# Patient Record
Sex: MALE | Race: WHITE | NOT HISPANIC OR LATINO | URBAN - METROPOLITAN AREA
[De-identification: names, ages, dates, MRNs, and addresses within clinical notes are randomized per-mention and may not be internally consistent; named-entity substitution may affect disease eponyms.]

---

## 2023-02-20 ENCOUNTER — OFFICE (OUTPATIENT)
Dept: URBAN - METROPOLITAN AREA CLINIC 27 | Facility: CLINIC | Age: 68
End: 2023-02-20
Payer: COMMERCIAL

## 2023-02-20 VITALS
SYSTOLIC BLOOD PRESSURE: 145 MMHG | HEART RATE: 94 BPM | WEIGHT: 215 LBS | DIASTOLIC BLOOD PRESSURE: 82 MMHG | TEMPERATURE: 97.5 F | HEIGHT: 70 IN

## 2023-02-20 DIAGNOSIS — E83.110 HEREDITARY HEMOCHROMATOSIS: ICD-10-CM

## 2023-02-20 DIAGNOSIS — Z80.0 FAMILY HISTORY OF MALIGNANT NEOPLASM OF DIGESTIVE ORGANS: ICD-10-CM

## 2023-02-20 DIAGNOSIS — Z86.010 PERSONAL HISTORY OF COLONIC POLYPS: ICD-10-CM

## 2023-02-20 DIAGNOSIS — K57.90 DIVERTICULOSIS OF INTESTINE, PART UNSPECIFIED, WITHOUT PERFO: ICD-10-CM

## 2023-02-20 DIAGNOSIS — K21.9 GASTRO-ESOPHAGEAL REFLUX DISEASE WITHOUT ESOPHAGITIS: ICD-10-CM

## 2023-02-20 PROCEDURE — 99214 OFFICE O/P EST MOD 30 MIN: CPT | Performed by: INTERNAL MEDICINE

## 2023-05-15 ENCOUNTER — HOSPITAL ENCOUNTER (OUTPATIENT)
Dept: DATA CONVERSION | Facility: HOSPITAL | Age: 68
End: 2023-05-15
Attending: OTOLARYNGOLOGY | Admitting: OTOLARYNGOLOGY
Payer: COMMERCIAL

## 2023-05-15 DIAGNOSIS — J34.2 DEVIATED NASAL SEPTUM: ICD-10-CM

## 2023-05-15 DIAGNOSIS — J34.89 OTHER SPECIFIED DISORDERS OF NOSE AND NASAL SINUSES: ICD-10-CM

## 2023-05-15 DIAGNOSIS — E78.5 HYPERLIPIDEMIA, UNSPECIFIED: ICD-10-CM

## 2023-05-15 DIAGNOSIS — K21.9 GASTRO-ESOPHAGEAL REFLUX DISEASE WITHOUT ESOPHAGITIS: ICD-10-CM

## 2023-05-15 DIAGNOSIS — J34.3 HYPERTROPHY OF NASAL TURBINATES: ICD-10-CM

## 2023-05-15 DIAGNOSIS — G47.33 OBSTRUCTIVE SLEEP APNEA (ADULT) (PEDIATRIC): ICD-10-CM

## 2023-05-15 DIAGNOSIS — J32.4 CHRONIC PANSINUSITIS: ICD-10-CM

## 2023-05-15 DIAGNOSIS — G43.909 MIGRAINE, UNSPECIFIED, NOT INTRACTABLE, WITHOUT STATUS MIGRAINOSUS: ICD-10-CM

## 2023-06-12 PROBLEM — E53.8 VITAMIN B12 DEFICIENCY: Status: ACTIVE | Noted: 2023-06-12

## 2023-06-12 PROBLEM — E78.00 HYPERCHOLESTEROLEMIA: Status: ACTIVE | Noted: 2023-06-12

## 2023-06-12 PROBLEM — Z00.00 MEDICARE ANNUAL WELLNESS VISIT, SUBSEQUENT: Status: ACTIVE | Noted: 2023-06-12

## 2023-06-12 PROBLEM — Z11.59 ENCOUNTER FOR HEPATITIS C SCREENING TEST FOR LOW RISK PATIENT: Chronic | Status: ACTIVE | Noted: 2023-06-12

## 2023-06-12 PROBLEM — J34.3 HYPERTROPHY OF BOTH INFERIOR NASAL TURBINATES: Status: ACTIVE | Noted: 2023-06-12

## 2023-06-12 PROBLEM — E83.119 HEMOCHROMATOSIS, UNSPECIFIED: Status: ACTIVE | Noted: 2023-06-12

## 2023-06-12 PROBLEM — D69.6 THROMBOCYTOPENIA (CMS-HCC): Status: ACTIVE | Noted: 2023-06-12

## 2023-06-12 PROBLEM — R73.03 PREDIABETES: Status: ACTIVE | Noted: 2023-06-12

## 2023-06-12 PROBLEM — E55.9 VITAMIN D DEFICIENCY: Status: ACTIVE | Noted: 2023-06-12

## 2023-06-12 PROBLEM — J32.4 CHRONIC PANSINUSITIS: Status: ACTIVE | Noted: 2023-06-12

## 2023-06-12 PROBLEM — G43.909 MIGRAINE: Status: ACTIVE | Noted: 2023-06-12

## 2023-06-12 PROBLEM — R97.20 ELEVATED PSA: Status: ACTIVE | Noted: 2023-06-12

## 2023-06-12 PROBLEM — E83.19 IRON OVERLOAD: Status: ACTIVE | Noted: 2023-06-12

## 2023-06-12 PROBLEM — Z11.59 ENCOUNTER FOR HEPATITIS C SCREENING TEST FOR LOW RISK PATIENT: Status: ACTIVE | Noted: 2023-06-12

## 2023-06-12 PROBLEM — J34.89 CONCHA BULLOSA: Status: ACTIVE | Noted: 2023-06-12

## 2023-06-12 PROBLEM — G47.33 OSA (OBSTRUCTIVE SLEEP APNEA): Status: ACTIVE | Noted: 2023-06-12

## 2023-06-12 PROBLEM — J30.9 ALLERGIC RHINITIS: Status: ACTIVE | Noted: 2023-06-12

## 2023-06-12 PROBLEM — F33.42 RECURRENT MAJOR DEPRESSIVE DISORDER, IN FULL REMISSION (CMS-HCC): Status: ACTIVE | Noted: 2023-06-12

## 2023-06-12 PROBLEM — Z00.00 MEDICARE ANNUAL WELLNESS VISIT, SUBSEQUENT: Chronic | Status: ACTIVE | Noted: 2023-06-12

## 2023-06-12 PROBLEM — J34.2 DEVIATED NASAL SEPTUM: Status: ACTIVE | Noted: 2023-06-12

## 2023-06-12 PROBLEM — H90.72 MIXED CONDUCTIVE AND SENSORINEURAL HEARING LOSS OF LEFT EAR WITH UNRESTRICTED HEARING OF RIGHT EAR: Status: ACTIVE | Noted: 2023-06-12

## 2023-06-12 RX ORDER — PSYLLIUM HUSK 0.4 G
CAPSULE ORAL
COMMUNITY
Start: 2022-12-16

## 2023-06-12 RX ORDER — SALINE NASAL SPRAY 1.5 OZ
SOLUTION NASAL
COMMUNITY
Start: 2023-05-15 | End: 2023-12-18 | Stop reason: WASHOUT

## 2023-06-12 RX ORDER — ACETAMINOPHEN 500 MG
TABLET ORAL
COMMUNITY
Start: 2022-12-16

## 2023-06-12 RX ORDER — LANOLIN ALCOHOL/MO/W.PET/CERES
CREAM (GRAM) TOPICAL
COMMUNITY
Start: 2022-12-16

## 2023-06-12 RX ORDER — RIZATRIPTAN BENZOATE 10 MG/1
TABLET ORAL
COMMUNITY
Start: 2022-07-01

## 2023-06-12 RX ORDER — AZELASTINE HCL 205.5 UG/1
SPRAY NASAL 2 TIMES DAILY
COMMUNITY
Start: 2022-09-29 | End: 2023-12-15 | Stop reason: WASHOUT

## 2023-06-12 RX ORDER — PROPRANOLOL HYDROCHLORIDE 60 MG/1
1 TABLET ORAL EVERY 12 HOURS
COMMUNITY
Start: 2012-11-19

## 2023-06-12 RX ORDER — TAMSULOSIN HYDROCHLORIDE 0.4 MG/1
2 CAPSULE ORAL DAILY
COMMUNITY

## 2023-06-12 RX ORDER — VIT C/E/ZN/COPPR/LUTEIN/ZEAXAN 250MG-90MG
CAPSULE ORAL
COMMUNITY

## 2023-06-12 RX ORDER — CYANOCOBALAMIN 1000 UG/ML
INJECTION, SOLUTION INTRAMUSCULAR; SUBCUTANEOUS
COMMUNITY
End: 2023-06-16 | Stop reason: WASHOUT

## 2023-06-12 RX ORDER — OMEPRAZOLE 20 MG/1
CAPSULE, DELAYED RELEASE ORAL
COMMUNITY

## 2023-06-13 LAB
COMPLETE PATHOLOGY REPORT: NORMAL
CONVERTED CLINICAL DIAGNOSIS-HISTORY: NORMAL
CONVERTED FINAL DIAGNOSIS: NORMAL
CONVERTED FINAL REPORT PDF LINK TO COPY AND PASTE: NORMAL
CONVERTED GROSS DESCRIPTION: NORMAL

## 2023-06-14 NOTE — PROGRESS NOTES
Mitchell Stock is a 68 y.o. male here for follow up   Chief Complaint   Patient presents with    Medicare Annual Wellness Visit Subsequent    Follow-up       Wellness/health maintenance also discussed    Pt is doing well     Chief Complaint   Patient presents with    Medicare Annual Wellness Visit Subsequent    Follow-up        HPI     pt is here for f/u depression, vit d vitb12, migraines, prediabetes , chol , elevated BMI, vit D      pt states that he would like to discuss his recent sinus surgery with you    Pt would also like to discuss some minor tremors that he is having in both hands- intermittently, this has been going on for several years - pt does see Dr Lee- has discussed with them- dx benign essential tremors - pt is on propranalol for migraines     pt is doing well       May 15 had sinus surgery - Dr Isaac - doing well but per pt - he did have rough time waking up after surgery - no event occurred however        Other medical specialists are involved in patient's care.     Dr Larry Guardado- manages- hemachromatosis- ferritin normal Feb , low platelets- normal in Feb      Dr Lefty Cuevas        Any recent notes that were available were reviewed.     All conditions are monitored, evaluated and assessed regularly.     Patient is compliant with current treatment regimen/medications.      discussed P-20       Medicare Wellness Exam    The patent is being seen for subsequent  annual wellness visit  Past Medical, Surgical and family History: Reviewed and updated in chart  Medications and Supplements: Review of all medications by a prescribing practitioner or clinical pharmacist (such as prescriptions, OTC, Herbal therapies and supplements) documented in the medical record.      Immunization History   Administered Date(s) Administered    Hep A, Adult 04/24/2000    Hep B, adult 04/24/2000, 05/30/2000    IPV 05/17/2005    Influenza, High Dose Seasonal, Preservative Free 10/10/2021,  10/18/2022    Influenza, High-dose Seasonal, Quadrivalent, Preservative Free 10/15/2020    Influenza, Unspecified 10/13/2014, 09/01/2019    Influenza, seasonal, injectable 10/26/2009, 10/12/2013, 10/15/2015, 10/04/2016, 10/15/2020    Pfizer Purple Cap SARS-CoV-2 03/08/2021, 03/29/2021, 10/10/2021, 10/18/2022    Pneumococcal Conjugate PCV 13 04/04/2019    Pneumococcal Polysaccharide PPSV23 06/03/2020    Tdap 01/01/2005, 06/24/2008, 02/19/2016    Typhoid, Unspecified 08/03/2011    Zoster, live 01/01/2013         Health Maintenance Topics with due status: Overdue       Topic Date Due    Yearly Adult Physical today                     OBJECTIVE:      Vitals:    06/16/23 0908   BP: 125/76   BP Location: Left arm   Patient Position: Sitting   BP Cuff Size: Large adult   Pulse: 62   Resp: 14   Temp: 36.8 °C (98.2 °F)   TempSrc: Temporal   SpO2: 96%   Weight: 96.5 kg (212 lb 11.2 oz)         Patient is alert and oriented x 3 , NAD  HEAD- normocephalic and atraumatic   EYES-conjunctiva-normal, lids - normal  EARS/NOSE- normal external exam   NECK-supple,FROM  CV- RRR without murmur  PULM- CTA bilaterally, normal respiratory effort  RESPIRATORY EFFORT- normal , no retractions or nasal flaring   ABD- normoactive BS's   EXT- no edema,NT  SKIN- no abnormal skin lesions noted  NEURO- no focal deficits  PSYCH- pleasant, normal judgement and insight    The number and complexity of problems addressed is considered moderate.  The amount and/or complexity of data reviewed and analyzed is considered moderate. The risk of complications and/or morbidity/mortality of patient is considered moderate. Overall, this patient encounter is considered a moderate risk visit.    Patient has no concerns with pain today.      Patient is not on any high risk medications.      Patient is compliant with their medications.      Common Side Effects- Beta-blockers:    Dizziness  Feeling tired  Feeling lightheaded  Vision problems  Swelling of the hands,  feet, ankles, or legs  Decreased sexual ability    Call your doctor if you have any of these signs:  Chest pain  Irregular heartbeat  Painful erection in men    Patient's BMI is elevated.  Plan- diet and exercise- BMI is elevated. Need to increase activity on a daily basis especially walking.  Monitor  total calories per day- decrease carbohydrates and fats. Goal - lose 1-2 pounds per week.    Recommend 150 minutes of moderate-intensity exercise as tolerated per week and 2-3 days of resistance, flexibility, and neuromotor exercises per week.    Normal BMI- 18.5-25    Overweight=  BMI 26-29    Obese= BMI 30-39    Morbidly Obese = BMI >40      ASSESSMENT/PLAN:          Problem List Items Addressed This Visit          Active Problems    Allergic rhinitis    Relevant Orders    Follow Up In [facility] - PCP    Hemochromatosis, unspecified    Relevant Orders    Follow Up In [facility] - PCP    Hypercholesterolemia    Relevant Orders    Hepatic Function Panel    Lipid Panel    Medicare annual wellness visit, subsequent - Primary (Chronic)    Migraine    ELISEO (obstructive sleep apnea)    Prediabetes    Relevant Orders    Hemoglobin A1C    Recurrent major depressive disorder, in full remission (CMS/[facility])    Thrombocytopenia (CMS/[facility])    Vitamin B12 deficiency    Relevant Orders    Vitamin B12    Vitamin D deficiency    Relevant Orders    Vitamin D, Total     Other Visit Diagnoses       Screening for multiple conditions        Class 1 obesity due to excess calories with body mass index (BMI) of 30.0 to 30.9 in adult, unspecified whether serious comorbidity present                    Orders Placed This Encounter   Procedures    Vitamin D, Total    Vitamin B12    Hepatic Function Panel    Lipid Panel    Hemoglobin A1C         Continue medications      Ordered lab      Follow up in 6 months

## 2023-06-16 ENCOUNTER — OFFICE VISIT (OUTPATIENT)
Dept: PRIMARY CARE | Facility: CLINIC | Age: 68
End: 2023-06-16
Payer: MEDICARE

## 2023-06-16 ENCOUNTER — LAB (OUTPATIENT)
Dept: LAB | Facility: LAB | Age: 68
End: 2023-06-16
Payer: MEDICARE

## 2023-06-16 VITALS
BODY MASS INDEX: 30.52 KG/M2 | WEIGHT: 212.7 LBS | HEART RATE: 62 BPM | RESPIRATION RATE: 14 BRPM | DIASTOLIC BLOOD PRESSURE: 76 MMHG | SYSTOLIC BLOOD PRESSURE: 125 MMHG | TEMPERATURE: 98.2 F | OXYGEN SATURATION: 96 %

## 2023-06-16 DIAGNOSIS — G47.33 OSA (OBSTRUCTIVE SLEEP APNEA): ICD-10-CM

## 2023-06-16 DIAGNOSIS — E53.8 VITAMIN B12 DEFICIENCY: ICD-10-CM

## 2023-06-16 DIAGNOSIS — E55.9 VITAMIN D DEFICIENCY: ICD-10-CM

## 2023-06-16 DIAGNOSIS — E83.119 HEMOCHROMATOSIS, UNSPECIFIED HEMOCHROMATOSIS TYPE: ICD-10-CM

## 2023-06-16 DIAGNOSIS — J30.9 ALLERGIC RHINITIS, UNSPECIFIED SEASONALITY, UNSPECIFIED TRIGGER: ICD-10-CM

## 2023-06-16 DIAGNOSIS — Z00.00 MEDICARE ANNUAL WELLNESS VISIT, SUBSEQUENT: Primary | ICD-10-CM

## 2023-06-16 DIAGNOSIS — D69.6 THROMBOCYTOPENIA (CMS-HCC): ICD-10-CM

## 2023-06-16 DIAGNOSIS — E78.00 HYPERCHOLESTEROLEMIA: ICD-10-CM

## 2023-06-16 DIAGNOSIS — E66.09 CLASS 1 OBESITY DUE TO EXCESS CALORIES WITH BODY MASS INDEX (BMI) OF 30.0 TO 30.9 IN ADULT, UNSPECIFIED WHETHER SERIOUS COMORBIDITY PRESENT: ICD-10-CM

## 2023-06-16 DIAGNOSIS — R73.03 PREDIABETES: ICD-10-CM

## 2023-06-16 DIAGNOSIS — G43.809 OTHER MIGRAINE WITHOUT STATUS MIGRAINOSUS, NOT INTRACTABLE: ICD-10-CM

## 2023-06-16 DIAGNOSIS — Z13.89 SCREENING FOR MULTIPLE CONDITIONS: ICD-10-CM

## 2023-06-16 DIAGNOSIS — F33.42 RECURRENT MAJOR DEPRESSIVE DISORDER, IN FULL REMISSION (CMS-HCC): ICD-10-CM

## 2023-06-16 PROBLEM — R25.1 TREMORS OF NERVOUS SYSTEM: Status: ACTIVE | Noted: 2023-06-16

## 2023-06-16 PROCEDURE — G0442 ANNUAL ALCOHOL SCREEN 15 MIN: HCPCS | Performed by: FAMILY MEDICINE

## 2023-06-16 PROCEDURE — 36415 COLL VENOUS BLD VENIPUNCTURE: CPT

## 2023-06-16 PROCEDURE — 80076 HEPATIC FUNCTION PANEL: CPT

## 2023-06-16 PROCEDURE — 1157F ADVNC CARE PLAN IN RCRD: CPT | Performed by: FAMILY MEDICINE

## 2023-06-16 PROCEDURE — 83036 HEMOGLOBIN GLYCOSYLATED A1C: CPT

## 2023-06-16 PROCEDURE — 99214 OFFICE O/P EST MOD 30 MIN: CPT | Performed by: FAMILY MEDICINE

## 2023-06-16 PROCEDURE — 1036F TOBACCO NON-USER: CPT | Performed by: FAMILY MEDICINE

## 2023-06-16 PROCEDURE — 80061 LIPID PANEL: CPT

## 2023-06-16 PROCEDURE — 3008F BODY MASS INDEX DOCD: CPT | Performed by: FAMILY MEDICINE

## 2023-06-16 PROCEDURE — 1160F RVW MEDS BY RX/DR IN RCRD: CPT | Performed by: FAMILY MEDICINE

## 2023-06-16 PROCEDURE — 82607 VITAMIN B-12: CPT

## 2023-06-16 PROCEDURE — 82306 VITAMIN D 25 HYDROXY: CPT

## 2023-06-16 PROCEDURE — 1170F FXNL STATUS ASSESSED: CPT | Performed by: FAMILY MEDICINE

## 2023-06-16 PROCEDURE — G0439 PPPS, SUBSEQ VISIT: HCPCS | Performed by: FAMILY MEDICINE

## 2023-06-16 PROCEDURE — 1159F MED LIST DOCD IN RCRD: CPT | Performed by: FAMILY MEDICINE

## 2023-06-16 RX ORDER — FLUTICASONE PROPIONATE 93 UG/1
SPRAY, METERED NASAL
COMMUNITY
Start: 2023-06-14 | End: 2023-12-22 | Stop reason: SDUPTHER

## 2023-06-16 ASSESSMENT — ACTIVITIES OF DAILY LIVING (ADL)
DRESSING: INDEPENDENT
GROCERY_SHOPPING: INDEPENDENT
MANAGING_FINANCES: INDEPENDENT
TAKING_MEDICATION: INDEPENDENT
BATHING: INDEPENDENT
DOING_HOUSEWORK: INDEPENDENT

## 2023-06-16 ASSESSMENT — LIFESTYLE VARIABLES: HOW OFTEN DO YOU HAVE A DRINK CONTAINING ALCOHOL: NEVER

## 2023-06-16 ASSESSMENT — PATIENT HEALTH QUESTIONNAIRE - PHQ9
SUM OF ALL RESPONSES TO PHQ9 QUESTIONS 1 AND 2: 0
1. LITTLE INTEREST OR PLEASURE IN DOING THINGS: NOT AT ALL
2. FEELING DOWN, DEPRESSED OR HOPELESS: NOT AT ALL

## 2023-06-17 LAB
ALANINE AMINOTRANSFERASE (SGPT) (U/L) IN SER/PLAS: 20 U/L (ref 10–52)
ALBUMIN (G/DL) IN SER/PLAS: 4.5 G/DL (ref 3.4–5)
ALKALINE PHOSPHATASE (U/L) IN SER/PLAS: 84 U/L (ref 33–136)
ASPARTATE AMINOTRANSFERASE (SGOT) (U/L) IN SER/PLAS: 17 U/L (ref 9–39)
BILIRUBIN DIRECT (MG/DL) IN SER/PLAS: 0.1 MG/DL (ref 0–0.3)
BILIRUBIN TOTAL (MG/DL) IN SER/PLAS: 0.6 MG/DL (ref 0–1.2)
CALCIDIOL (25 OH VITAMIN D3) (NG/ML) IN SER/PLAS: 67 NG/ML
CHOLESTEROL (MG/DL) IN SER/PLAS: 217 MG/DL (ref 0–199)
CHOLESTEROL IN HDL (MG/DL) IN SER/PLAS: 50.4 MG/DL
CHOLESTEROL/HDL RATIO: 4.3
COBALAMIN (VITAMIN B12) (PG/ML) IN SER/PLAS: 514 PG/ML (ref 211–911)
LDL: 145 MG/DL (ref 0–99)
PROTEIN TOTAL: 7.1 G/DL (ref 6.4–8.2)
TRIGLYCERIDE (MG/DL) IN SER/PLAS: 109 MG/DL (ref 0–149)
VLDL: 22 MG/DL (ref 0–40)

## 2023-06-19 LAB
ESTIMATED AVERAGE GLUCOSE FOR HBA1C: 103 MG/DL
HEMOGLOBIN A1C/HEMOGLOBIN TOTAL IN BLOOD: 5.2 %

## 2023-08-10 LAB
ALANINE AMINOTRANSFERASE (SGPT) (U/L) IN SER/PLAS: 23 U/L (ref 10–52)
ALBUMIN (G/DL) IN SER/PLAS: 4.5 G/DL (ref 3.4–5)
ALKALINE PHOSPHATASE (U/L) IN SER/PLAS: 80 U/L (ref 33–136)
ANION GAP IN SER/PLAS: 14 MMOL/L (ref 10–20)
ASPARTATE AMINOTRANSFERASE (SGOT) (U/L) IN SER/PLAS: 19 U/L (ref 9–39)
BASOPHILS (10*3/UL) IN BLOOD BY AUTOMATED COUNT: 0.05 X10E9/L (ref 0–0.1)
BASOPHILS/100 LEUKOCYTES IN BLOOD BY AUTOMATED COUNT: 0.8 % (ref 0–2)
BILIRUBIN TOTAL (MG/DL) IN SER/PLAS: 0.6 MG/DL (ref 0–1.2)
CALCIUM (MG/DL) IN SER/PLAS: 9.4 MG/DL (ref 8.6–10.6)
CARBON DIOXIDE, TOTAL (MMOL/L) IN SER/PLAS: 24 MMOL/L (ref 21–32)
CHLORIDE (MMOL/L) IN SER/PLAS: 107 MMOL/L (ref 98–107)
CREATININE (MG/DL) IN SER/PLAS: 0.9 MG/DL (ref 0.5–1.3)
EOSINOPHILS (10*3/UL) IN BLOOD BY AUTOMATED COUNT: 0.17 X10E9/L (ref 0–0.7)
EOSINOPHILS/100 LEUKOCYTES IN BLOOD BY AUTOMATED COUNT: 2.8 % (ref 0–6)
ERYTHROCYTE DISTRIBUTION WIDTH (RATIO) BY AUTOMATED COUNT: 11.9 % (ref 11.5–14.5)
ERYTHROCYTE MEAN CORPUSCULAR HEMOGLOBIN CONCENTRATION (G/DL) BY AUTOMATED: 33.9 G/DL (ref 32–36)
ERYTHROCYTE MEAN CORPUSCULAR VOLUME (FL) BY AUTOMATED COUNT: 95 FL (ref 80–100)
ERYTHROCYTES (10*6/UL) IN BLOOD BY AUTOMATED COUNT: 4.76 X10E12/L (ref 4.5–5.9)
FERRITIN (UG/LL) IN SER/PLAS: 35 UG/L (ref 20–300)
GFR MALE: >90 ML/MIN/1.73M2
GLUCOSE (MG/DL) IN SER/PLAS: 87 MG/DL (ref 74–99)
HEMATOCRIT (%) IN BLOOD BY AUTOMATED COUNT: 45.1 % (ref 41–52)
HEMOGLOBIN (G/DL) IN BLOOD: 15.3 G/DL (ref 13.5–17.5)
IMMATURE GRANULOCYTES/100 LEUKOCYTES IN BLOOD BY AUTOMATED COUNT: 0.3 % (ref 0–0.9)
IRON (UG/DL) IN SER/PLAS: 105 UG/DL (ref 35–150)
IRON BINDING CAPACITY (UG/DL) IN SER/PLAS: 321 UG/DL (ref 240–445)
IRON SATURATION (%) IN SER/PLAS: 33 % (ref 25–45)
LEUKOCYTES (10*3/UL) IN BLOOD BY AUTOMATED COUNT: 6 X10E9/L (ref 4.4–11.3)
LYMPHOCYTES (10*3/UL) IN BLOOD BY AUTOMATED COUNT: 2.46 X10E9/L (ref 1.2–4.8)
LYMPHOCYTES/100 LEUKOCYTES IN BLOOD BY AUTOMATED COUNT: 41.1 % (ref 13–44)
MONOCYTES (10*3/UL) IN BLOOD BY AUTOMATED COUNT: 0.56 X10E9/L (ref 0.1–1)
MONOCYTES/100 LEUKOCYTES IN BLOOD BY AUTOMATED COUNT: 9.4 % (ref 2–10)
NEUTROPHILS (10*3/UL) IN BLOOD BY AUTOMATED COUNT: 2.72 X10E9/L (ref 1.2–7.7)
NEUTROPHILS/100 LEUKOCYTES IN BLOOD BY AUTOMATED COUNT: 45.6 % (ref 40–80)
NRBC (PER 100 WBCS) BY AUTOMATED COUNT: 0 /100 WBC (ref 0–0)
PLATELETS (10*3/UL) IN BLOOD AUTOMATED COUNT: 216 X10E9/L (ref 150–450)
POTASSIUM (MMOL/L) IN SER/PLAS: 4 MMOL/L (ref 3.5–5.3)
PROSTATE SPECIFIC AG (NG/ML) IN SER/PLAS: 3.9 NG/ML (ref 0–4)
PROTEIN TOTAL: 7.2 G/DL (ref 6.4–8.2)
SODIUM (MMOL/L) IN SER/PLAS: 141 MMOL/L (ref 136–145)
UREA NITROGEN (MG/DL) IN SER/PLAS: 13 MG/DL (ref 6–23)

## 2023-08-25 ENCOUNTER — OFFICE (OUTPATIENT)
Dept: URBAN - METROPOLITAN AREA CLINIC 27 | Facility: CLINIC | Age: 68
End: 2023-08-25
Payer: COMMERCIAL

## 2023-08-25 VITALS — WEIGHT: 218 LBS | HEIGHT: 70 IN | TEMPERATURE: 97.7 F

## 2023-08-25 DIAGNOSIS — Z80.0 FAMILY HISTORY OF MALIGNANT NEOPLASM OF DIGESTIVE ORGANS: ICD-10-CM

## 2023-08-25 DIAGNOSIS — K57.90 DIVERTICULOSIS OF INTESTINE, PART UNSPECIFIED, WITHOUT PERFO: ICD-10-CM

## 2023-08-25 PROCEDURE — 99214 OFFICE O/P EST MOD 30 MIN: CPT | Performed by: INTERNAL MEDICINE

## 2023-09-07 VITALS
DIASTOLIC BLOOD PRESSURE: 91 MMHG | SYSTOLIC BLOOD PRESSURE: 196 MMHG | RESPIRATION RATE: 16 BRPM | HEART RATE: 65 BPM | TEMPERATURE: 97.3 F

## 2023-09-14 NOTE — H&P
History of Present Illness:   History Present Illness:  Reason for surgery: sinus surgery   HPI:    HPI: No significant changes to the medical history since last clinic visit with ENT.    PMH: reviewed in chart   Fam Hx: Reviewed in EMR  Social Hx: Reviewed in EMR  Allergies: Reviewed in EMR  ROS: negative except as above in HPI  Medications, imaging and pertinent labs reviewed in EMR    A/P  Proceed with planned surgery     Allergies:        Allergies:  ·  No Known Allergies :     Home Medication Review:   Home Medications Reviewed: yes     Impression/Procedure:   ·  Impression and Planned Procedure: sinus surgery       ERAS (Enhanced Recovery After Surgery):  ·  ERAS Patient: no       Vital Signs:  Temperature C: 36.3 degrees C   Temperature F: 97.3 degrees F   Heart Rate: 65 beats per minute   Respiratory Rate: 16 breath per minute   Blood Pressure Systolic: 196 mm/Hg   Blood Pressure Diastolic: 91 mm/Hg     Physical Exam by System:    Respiratory/Thorax: Unlabored breathing on RA   Cardiovascular: No clubbing/cyanosis/edema of hands     Consent:   COVID-19 Consent:  ·  COVID-19 Risk Consent Surgeon has reviewed key risks related to the risk of stacey COVID-19 and if they contract COVID-19 what the risks are.     Attestation:   Note Completion:  I am a:  Resident/Fellow   Attending Attestation I saw and evaluated the patient.  I personally obtained the key and critical portions of the history and physical exam or was physically present for key and  critical portions performed by the resident/fellow. I reviewed the resident/fellow?s documentation and discussed the patient with the resident/fellow.  I agree with the resident/fellow?s medical decision making as documented in the note.     I personally evaluated the patient on 15-May-2023         Electronic Signatures:  Apolinar Isaac)  (Signed 15-May-2023 07:28)   Authored: Physical Exam, Note Completion   Co-Signer: History of Present Illness,  Allergies, Home Medication Review, Impression/Procedure, ERAS, Physical Exam, Consent, Note Completion  Issa Augustin (Resident))  (Signed 15-May-2023 06:58)   Authored: History of Present Illness, Allergies, Home  Medication Review, Impression/Procedure, ERAS, Physical Exam, Consent, Note Completion      Last Updated: 15-May-2023 07:28 by Apolinar Isaac)

## 2023-10-02 NOTE — OP NOTE
PROCEDURE DETAILS    Preoperative Diagnosis:  1. Bilateral chronic pansinusitis   2. Nasal septal deviation  3. Bilateral inferior turbinate hypertrophy   4. Nasal obstruction and drainage  5. Bilateral leo bullosa   Postoperative Diagnosis:  1. Bilateral chronic pansinusitis   2. Nasal septal deviation  3. Bilateral inferior turbinate hypertrophy   4. Nasal obstruction and drainage  5. Bilateral leo bullosa   Surgeon: MD Poncho  Resident/Fellow/Other Assistant: MD Charli    Procedure:  1. Bilateral endoscopic total ethmoidectomies with sphenoidotomies with removal of tissue from sinuses  2. Bilateral endoscopic frontal sinusotomies with frontal sinus explorations  3. Bilateral endoscopic maxillary antrostomies with removal of tissue from sinuses   4. Bilateral endoscopic conchal bullosa resections  5. Septoplasty  6. Bilateral inferior turbinate submucous resection  7. Imaged guidance navigation - extradural  Anesthesia: General  Estimated Blood Loss: 300cc  Findings: see below   Complications: none        Operative Report:   Date of service: 5/15/2023  Site of Service: [facility]    Preoperative Diagnosis:   1. Bilateral chronic pansinusitis  2. Nasal septal deviation  3. Bilateral inferior turbinate hypertrophy   4. Nasal obstruction and drainage  5. Bilateral leo bullosa     Postoperative Diagnoses:   1. Bilateral chronic pansinusitis   2. Nasal septal deviation  3. Bilateral inferior turbinate hypertrophy   4. Nasal obstruction and drainage  5. Bilateral leo bullosa     Operation/Procedure(s):   1. Bilateral endoscopic total ethmoidectomies with sphenoidotomies   2. Bilateral endoscopic frontal sinusotomies with frontal sinus explorations  3. Bilateral endoscopic maxillary antrostomies with removal of tissue from sinuses   4. Bilateral endoscopic conchal bullosa resections  5. Septoplasty  6. Bilateral inferior turbinate submucous resection  7. Imaged guidance navigation -  extradural    Surgeon: Apolinar Isaac MD    Assistant(s): MD Charli (resident)    EBL: 300 ml    Anesthesia: General and local     Operative Findings:  1. Chronic inflammation through all sinuses with inflammatory disease - worse in right max with extensive purulence - removal of middle turbinates/leo bullosa bilaterally  2. Hay Splint sutured to the septum bilaterally  3. Absorbable hemostatic material in the ethmoids    Operative Indications:   The patient is a 68 year old male with a history of chronic rhinosinusitis - pansinusitis, deviated nasal septum, leo bullosa and inferior turbinate hypertrophy. The patient was treated with maximal medical therapy and a post-treatment CT scan showed  persistent disease. Therefore, the risks, benefits, and alternatives of the above procedures were discussed and the patient agreed to proceed. Please see the ambulatory EMR for full documentation and detail of this discussion.    Operative/Procedure Narrative:   The patient was brought into the operating room and laid in a supine position on the operating room table. A surgical huddle was conducted to verify the patient and the procedure to be performed. General anesthesia was induced and the patient's airway  was secured with an ET tube. A time out was called. The nasal cavities were sprayed with 0.5% Afrin. The right and left nasal cavities were then injected with lidocaine 1% with 1:100,000 epinephrine. Next, image guidance was attached and registered. The  image guidance was used through the procedure for identification of critical landmarks. Once the image guidance was calibrated, the nasal cavities were examined with a 0 degree endoscope.    The patient was noted to have enlarged inferior turbinates on the right and left, a broad septal deviation to the right, with septal spur to the left.   Using a zero degree endoscope for visualization and a [place] elevator on the left, the middle turbinate  was medialized. We  then first used the sinus scissors to remove the leo bullosa in total and then resecting the middle turbinate. The double ball probe and backbiting forceps were used to perform a retrograde uncinectomy. The probe was then used to  cannulate the maxillary os and a large maxillary antrostomy was made with a straight through cutting forceps. The edges were cleaned up with the microdebrider. The maxillary sinus was entered and suctioned and polypodial tissue was removed. Attention  was then turned to the ethmoid air cells. The ethmoid bulla and basal lamella were resected with cutting instruments and the microdebrider. The location of the skull base and lamina papyracea was periodically confirmed with the surgical navigation. Posteriorly,  the superior turbinate was identified and the inferior third was excised using the microdebrider. The sphenoid ostium was cannulated using the probe. The mushroom punch was used to complete a sphenoidotomy. We then proceeded in a posterior to anterior  fashion dissecting remnant ethmoid air cells of the skull base and orbit to complete a total ethmoidectomy. We then switched to a 70 degree scope and visualized the frontal outflow tract. Using a frontal sinus probe, the frontal os was identified. A frontal  sinusotomy was created using a Hosemann punch, giraffe forceps and a microdebrider.     Attention was then turned towards the septoplasty. A #15 blade was used to make a Modified [place] incision at the squamocolumnar junction on the left. A Claiborne elevator was used to elevate a mucoperichondrial flap on the left septum. An incision was  made through the cartilage using the suction elevator then the opposing septal flap was elevated. Using a Silvio forceps the intervening septal cartilage was removed, taking care to leave an approximately 2-cm dorsal and caudal cartilaginous strut  for support. There were unilateral mucosal tears but none contralaterally and we made a left  sided posterior septal drainage hole. The septum was then noted to be straight and the airway improved on both sides. The incision was closed using interrupted  4-0 plain gut sutures.     The right nasal cavity was then examined with the 0 degree endoscope and the middle turbinate was medialized with a freer.  We then resected the leo bullosa on the right and removed the middle turbinate with a scissors. The double ball probe and backbiting  forceps were used to perform a retrograde uncinectomy. The probe was then used to cannulate the maxillary os and a large maxillary antrostomy was made with a straight through cutting forceps. The edges were cleaned up with the microdebrider. The maxillary  sinus was entered and suctioned and polypoidal tissue and purulence was removed. Attention was then turned to the ethmoid air cells. The ethmoid bulla and basal lamella were resected with cutting instruments and the microdebrider. The location of the  skull base and lamina papyracea was periodically confirmed with the surgical navigation. Posteriorly, the superior turbinate was identified and the inferior third was excised using the microdebrider. The sphenoid ostium was cannulated using the probe.  The mushroom punch was used to complete a sphenoidotomy. We then proceeded in a posterior to anterior fashion dissecting remnant ethmoid air cells of the skull base and orbit to complete a total ethmoidectomy. We then switched to a 70 degree scope and  visualized the frontal outflow tract. Using a frontal sinus probe, the frontal os was identified. A frontal sinusotomy was created using a Hosemann punch, giraffe forceps and a microdebrider.     The right and left inferior turbinates were then infiltrated with 1% lidocaine with epinephrine. A stab incision was then made in the head of the left inferior turbinate and the right inferior turbinate. A Furnas elevator was used to elevate the mucous  membrane off the inferior conchal  bone on both sides and the microdebrider inferior turbinate blade attachment was then used to perform a submucous resection of tissue in the right and then the left inferior turbinates. The turbinates were then outfractured  laterally with a Boies elevator. The nasopharynx was then suctioned and the nose was copiously irrigated with normal saline. It was inspected for any bleeding and none was noted.  Absorbable hemostatic material was placed in the bilateral ethmoid cavities.   Hay splints were placed and secured to the septum with a prolene suture. This completed the surgical procedure. An OG tube was passed to suction out gastric contents. The patient was turned over to the anesthesia team for awakening and extubation.  They were transferred to the PACU in stable condition.     Implants/Packing: Bilateral absorbable hemostatic material, and hay splints sutured to septum    Specimens:   Bilateral sinonasal contents to pathology     Complications:   None    Attestation: I, Apolinar Isaac, was present for and completed all key portions of the procedure with the assistance of the resident as I deemed fit.                        Attestation:   Note Completion:  Attending Attestation I was present for the entire procedure    I am a: Resident/Fellow         Electronic Signatures:  Apolinar Isaac)  (Signed 15-May-2023 12:15)   Authored: Post-Operative Note, Chart Review, Note Completion   Co-Signer: Post-Operative Note, Chart Review, Note Completion  Issa Augustin (Resident))  (Signed 15-May-2023 12:03)   Authored: Post-Operative Note, Chart Review, Note Completion      Last Updated: 15-May-2023 12:15 by Apolinar Isaac)

## 2023-10-09 ENCOUNTER — OFFICE VISIT (OUTPATIENT)
Dept: PRIMARY CARE | Facility: CLINIC | Age: 68
End: 2023-10-09
Payer: MEDICARE

## 2023-10-09 ENCOUNTER — LAB (OUTPATIENT)
Dept: LAB | Facility: LAB | Age: 68
End: 2023-10-09
Payer: MEDICARE

## 2023-10-09 VITALS
BODY MASS INDEX: 31.37 KG/M2 | OXYGEN SATURATION: 98 % | DIASTOLIC BLOOD PRESSURE: 76 MMHG | HEART RATE: 53 BPM | TEMPERATURE: 97.7 F | WEIGHT: 218.6 LBS | SYSTOLIC BLOOD PRESSURE: 137 MMHG

## 2023-10-09 DIAGNOSIS — M79.603 PAIN OF UPPER EXTREMITY, UNSPECIFIED LATERALITY: Primary | ICD-10-CM

## 2023-10-09 DIAGNOSIS — M79.10 MYALGIA: ICD-10-CM

## 2023-10-09 DIAGNOSIS — R22.33: ICD-10-CM

## 2023-10-09 DIAGNOSIS — M25.50 ARTHRALGIA, UNSPECIFIED JOINT: ICD-10-CM

## 2023-10-09 DIAGNOSIS — M79.603 PAIN OF UPPER EXTREMITY, UNSPECIFIED LATERALITY: ICD-10-CM

## 2023-10-09 PROBLEM — U07.1 COVID-19 VIRUS INFECTION: Status: ACTIVE | Noted: 2023-10-09

## 2023-10-09 PROBLEM — K57.92 DIVERTICULITIS: Status: ACTIVE | Noted: 2021-04-02

## 2023-10-09 PROBLEM — T45.2X1A VITAMIN D TOXICITY: Status: ACTIVE | Noted: 2023-10-09

## 2023-10-09 LAB
ERYTHROCYTE [DISTWIDTH] IN BLOOD BY AUTOMATED COUNT: 12 % (ref 11.5–14.5)
ERYTHROCYTE [SEDIMENTATION RATE] IN BLOOD BY WESTERGREN METHOD: 8 MM/H (ref 0–20)
HCT VFR BLD AUTO: 44.6 % (ref 41–52)
HGB BLD-MCNC: 14.8 G/DL (ref 13.5–17.5)
MCH RBC QN AUTO: 31.3 PG (ref 26–34)
MCHC RBC AUTO-ENTMCNC: 33.2 G/DL (ref 32–36)
MCV RBC AUTO: 94 FL (ref 80–100)
NRBC BLD-RTO: 0 /100 WBCS (ref 0–0)
PLATELET # BLD AUTO: 239 X10*3/UL (ref 150–450)
PMV BLD AUTO: 10 FL (ref 7.5–11.5)
RBC # BLD AUTO: 4.73 X10*6/UL (ref 4.5–5.9)
WBC # BLD AUTO: 7 X10*3/UL (ref 4.4–11.3)

## 2023-10-09 PROCEDURE — 85027 COMPLETE CBC AUTOMATED: CPT

## 2023-10-09 PROCEDURE — 82550 ASSAY OF CK (CPK): CPT

## 2023-10-09 PROCEDURE — 86038 ANTINUCLEAR ANTIBODIES: CPT

## 2023-10-09 PROCEDURE — 99214 OFFICE O/P EST MOD 30 MIN: CPT | Performed by: FAMILY MEDICINE

## 2023-10-09 PROCEDURE — 86431 RHEUMATOID FACTOR QUANT: CPT

## 2023-10-09 PROCEDURE — 90662 IIV NO PRSV INCREASED AG IM: CPT | Performed by: FAMILY MEDICINE

## 2023-10-09 PROCEDURE — 36415 COLL VENOUS BLD VENIPUNCTURE: CPT

## 2023-10-09 PROCEDURE — 3008F BODY MASS INDEX DOCD: CPT | Performed by: FAMILY MEDICINE

## 2023-10-09 PROCEDURE — 86200 CCP ANTIBODY: CPT

## 2023-10-09 PROCEDURE — 1125F AMNT PAIN NOTED PAIN PRSNT: CPT | Performed by: FAMILY MEDICINE

## 2023-10-09 PROCEDURE — 1036F TOBACCO NON-USER: CPT | Performed by: FAMILY MEDICINE

## 2023-10-09 PROCEDURE — 1159F MED LIST DOCD IN RCRD: CPT | Performed by: FAMILY MEDICINE

## 2023-10-09 PROCEDURE — G0008 ADMIN INFLUENZA VIRUS VAC: HCPCS | Performed by: FAMILY MEDICINE

## 2023-10-09 PROCEDURE — 1160F RVW MEDS BY RX/DR IN RCRD: CPT | Performed by: FAMILY MEDICINE

## 2023-10-09 PROCEDURE — 86140 C-REACTIVE PROTEIN: CPT

## 2023-10-09 PROCEDURE — 85652 RBC SED RATE AUTOMATED: CPT

## 2023-10-09 RX ORDER — OXYBUTYNIN CHLORIDE 5 MG/1
5 TABLET ORAL
COMMUNITY

## 2023-10-09 ASSESSMENT — PAIN SCALES - GENERAL: PAINLEVEL: 3

## 2023-10-09 NOTE — PROGRESS NOTES
Subjective        Enrique Stock is a 68 y.o. male who presents for      HPI:          Chief Complaint   Patient presents with    Muscle Pain     BUE, more on the left    Immunizations     Flu vaccine requested.  Screening completed.       What concern/ problem/pain/symptom  brings you here today?  Muscle pain BUE      how long has pt had sxs?  3 weeks.. Denies injury.      describe symptoms-  2 or 3/10  presently      how often do symptoms occur?  Constantly with pain levels fluctuating      has pt tried anything for current symptoms, including medications (OTC or prescription)  ?  Extra Strength Tylenol, compression brace for L elbow      what makes symptoms worse?  Nothing      has pt been seen recently for this problem ( within past 2-3 weeks) ?  No    if yes- where?    by who?    what treatment was provided?        Other medical specialists are involved in patient's care.    Any recent notes that were available were reviewed.    All conditions are monitored, evaluated and assessed regularly.    Patient is compliant with current treatment regimen/medications.    Specialists involved include:      8/28/23 Dr Olea- GERD hemachromatosis    Dr Guardado - hemachromocytosis    Dr Isaac- ENT - 8/10/23    Social History     Tobacco Use   Smoking Status Never   Smokeless Tobacco Never         Review of Systems:        Objective        Vitals:    10/09/23 0923   BP: 137/76   BP Location: Left arm   Patient Position: Sitting   BP Cuff Size: Adult   Pulse: 53   Temp: 36.5 °C (97.7 °F)   TempSrc: Temporal   SpO2: 98%   Weight: 99.2 kg (218 lb 9.6 oz)       Patient is alert and oriented x 3 , NAD  HEAD- normocephalic and atraumatic   EYES-conjunctiva-normal, lids - normal  EARS/NOSE- normal external exam   NECK-supple,FROM  CV- RRR without murmur  PULM- CTA bilaterally, normal respiratory effort  RESPIRATORY EFFORT- normal , no retractions or nasal flaring   ABD- normoactive BS's   EXT- no edema,NT  Bilateral distal  humerus/antecubital fossa areas-- left > right - soft tissue mass- soft, NT, not red or warm , mobile   SKIN- no abnormal skin lesions noted  NEURO- no focal deficits  PSYCH- pleasant, normal judgement and insight                  BP Readings from Last 3 Encounters:   10/09/23 137/76   06/16/23 125/76   02/23/23 134/77           Wt Readings from Last 3 Encounters:   10/09/23 99.2 kg (218 lb 9.6 oz)   06/16/23 96.5 kg (212 lb 11.2 oz)   06/09/23 96.3 kg (212 lb 6.4 oz)         BMI Readings from Last 3 Encounters:   10/09/23 31.37 kg/m²   06/16/23 30.52 kg/m²   06/09/23 30.48 kg/m²     The number and complexity of problems addressed is considered moderate.  The amount and/or complexity of data reviewed and analyzed is considered moderate. The risk of complications and/or morbidity/mortality of patient is considered moderate. Overall, this patient encounter is considered a moderate risk visit.          Assessment/Plan      1. Pain of upper extremity, unspecified laterality  J LUIS with Reflex to TRISTAN    CBC    Citrulline Antibody, IgG    C-Reactive Protein    Rheumatoid Factor    Sedimentation Rate    CK      2. Myalgia  J LUIS with Reflex to TRISTAN    CBC    Citrulline Antibody, IgG    C-Reactive Protein    Rheumatoid Factor    Sedimentation Rate    CK      3. Arthralgia, unspecified joint  J LUIS with Reflex to TRISTAN    CBC    Citrulline Antibody, IgG    C-Reactive Protein    Rheumatoid Factor    Sedimentation Rate    CK      4. Mass of elbow region, bilateral  Referral to General Surgery          Orders Placed This Encounter   Procedures    Flu vaccine, quadrivalent, high-dose, preservative free, age 65y+ (FLUZONE)    J LUIS with Reflex to TRISTAN    CBC    Citrulline Antibody, IgG    C-Reactive Protein    Rheumatoid Factor    Sedimentation Rate    CK    Referral to General Surgery       Ordered labs       Refer general surgery      Call if no better or if symptoms worsen

## 2023-10-10 LAB
ANA SER QL HEP2 SUBST: NEGATIVE
CCP IGG SERPL-ACNC: <1 U/ML
CK SERPL-CCNC: 46 U/L (ref 0–325)
CRP SERPL-MCNC: 0.18 MG/DL
RHEUMATOID FACT SER NEPH-ACNC: <10 IU/ML (ref 0–15)

## 2023-10-17 ENCOUNTER — OFFICE VISIT (OUTPATIENT)
Dept: SURGERY | Facility: CLINIC | Age: 68
End: 2023-10-17
Payer: MEDICARE

## 2023-10-17 VITALS
WEIGHT: 219 LBS | OXYGEN SATURATION: 98 % | HEART RATE: 52 BPM | SYSTOLIC BLOOD PRESSURE: 156 MMHG | BODY MASS INDEX: 31.35 KG/M2 | DIASTOLIC BLOOD PRESSURE: 85 MMHG | TEMPERATURE: 98 F | HEIGHT: 70 IN

## 2023-10-17 DIAGNOSIS — R22.33: ICD-10-CM

## 2023-10-17 PROCEDURE — 1160F RVW MEDS BY RX/DR IN RCRD: CPT | Performed by: SURGERY

## 2023-10-17 PROCEDURE — 3008F BODY MASS INDEX DOCD: CPT | Performed by: SURGERY

## 2023-10-17 PROCEDURE — 1125F AMNT PAIN NOTED PAIN PRSNT: CPT | Performed by: SURGERY

## 2023-10-17 PROCEDURE — 99203 OFFICE O/P NEW LOW 30 MIN: CPT | Performed by: SURGERY

## 2023-10-17 PROCEDURE — 1159F MED LIST DOCD IN RCRD: CPT | Performed by: SURGERY

## 2023-10-17 PROCEDURE — 1036F TOBACCO NON-USER: CPT | Performed by: SURGERY

## 2023-10-17 ASSESSMENT — PAIN SCALES - GENERAL: PAINLEVEL: 2

## 2023-10-17 NOTE — PROGRESS NOTES
History Of Present Illness :  Enrique Stock is a 68 y.o. male who presents for evaluation of a soft tissue mass of the left arm.  The patient was recently seen by his primary physician, Dr. Anitha Zazueta, on 10/9/2023, and that note was reviewed.    6 weeks ago, the patient noted soreness and achiness in the left antecubital region, just proximal and medial.  He noted a soft tissue fullness.  This waxes and wanes.  No particular activity makes it worse or better.  He does note some occasional tingling radiating distally down the arm.  There is never had a similar lesion in the past.  He is referred to general surgery for further evaluation and treatment.  He has not had any imaging.    Of note, the patient has a history of hemochromatosis followed by Dr. Guardado from hematology.  Other medical problems include a history of migraine headaches, reflux, hyperlipidemia.    The patient has a history of a perforated sigmoid diverticulum.  On 4/2/2021 the patient underwent diagnostic laparoscopy with drain placement of the left lower quadrant for free air with perforated diverticulitis.  This was done at [facility].  This was a partially healed perforated diverticulum and no resection was performed.  This was performed by Dr. Torrez.  The patient also had an EGD in the same operative setting with excision of 2 gastric polyps.    The patient lives in [place].  He is  with 3 children.  He is a clergy.  He is a  at [employer] in [place].    Past Medical History   Past Medical History:   Diagnosis Date    Acute upper respiratory infection, unspecified 11/09/2016    Viral URI with cough    Contusion of unspecified front wall of thorax, initial encounter 03/28/2017    Contusion of chest wall    Cough, unspecified 01/20/2017    Cough    Cramp and spasm     Leg cramps    Encounter for follow-up examination after completed treatment for conditions other than malignant neoplasm 07/07/2017     Hospital discharge follow-up    Encounter for screening for diabetes mellitus 02/27/2017    Diabetes mellitus screening    Encounter for screening for malignant neoplasm of colon     Encounter for screening colonoscopy    Encounter for screening for malignant neoplasm of prostate 02/27/2017    Prostate cancer screening    Hereditary and idiopathic neuropathy, unspecified 07/20/2013    Hereditary and idiopathic neuropathy    Impacted cerumen, bilateral 07/03/2017    Bilateral impacted cerumen    Influenza due to unidentified influenza virus with other respiratory manifestations 04/07/2018    Influenza-like illness    Migraine, unspecified, not intractable, without status migrainosus 08/26/2016    Headache, migraine    Neuralgia and neuritis, unspecified 04/29/2016    Radicular pain in right arm    Otalgia, left ear 07/10/2017    Ear pain, left    Other abnormal glucose 04/02/2019    Abnormal glucose level    Other conditions influencing health status 03/29/2019    History of cough    Other specified abnormal findings of blood chemistry     High serum cholestanol    Otitis media, unspecified, left ear 08/18/2017    Otitis media, left    Otitis media, unspecified, left ear 07/17/2017    Acute left otitis media    Otitis media, unspecified, right ear 07/03/2017    Right otitis media    Otorrhea, left ear 08/18/2017    Ear drainage, left    Perforation of intestine (nontraumatic) (CMS/[facility]) 05/17/2021    Bowel perforation    Personal history of other diseases of male genital organs     History of prostate disorder    Personal history of other diseases of male genital organs 03/31/2014    History of prostatitis    Personal history of other diseases of the digestive system 05/17/2021    History of diverticulitis of colon    Personal history of other diseases of the digestive system     History of gastroesophageal reflux (GERD)    Personal history of other diseases of the musculoskeletal system and connective tissue  04/29/2016    History of osteopenia    Personal history of other diseases of the nervous system and sense organs     History of sleep apnea    Personal history of other diseases of the nervous system and sense organs 04/29/2016    History of neuropathy    Personal history of other diseases of the respiratory system 12/28/2017    History of acute bronchitis    Personal history of other diseases of the respiratory system 01/20/2017    History of acute pharyngitis    Personal history of other diseases of the respiratory system 02/19/2015    History of bronchitis    Personal history of other diseases of the respiratory system 03/29/2019    History of acute sinusitis    Personal history of other drug therapy     COVID-19 vaccine series completed    Personal history of other endocrine, nutritional and metabolic disease     History of obesity    Personal history of other malignant neoplasm of skin     History of malignant neoplasm of skin    Personal history of other mental and behavioral disorders     History of depression    Personal history of other specified conditions     History of heartburn    Personal history of other specified conditions     History of snoring    Pleurodynia 03/28/2017    Rib pain on right side    Pruritus, unspecified 04/06/2021    Ear itch    Radiculopathy, site unspecified 04/29/2016    Radicular neuropathy    Sensorineural hearing loss, bilateral 08/18/2017    Asymmetrical sensorineural hearing loss        Surgical History    Past Surgical History:   Procedure Laterality Date    COLONOSCOPY  02/27/2017    Complete Colonoscopy    KNEE ARTHROSCOPY W/ ARTHROTOMY  04/16/2020    Arthrotomy Of Knee With Open Meniscus Repair    OTHER SURGICAL HISTORY  11/30/2022    Endoscopy    OTHER SURGICAL HISTORY  03/31/2014    Mohs Micrographic Surgery Ear Left    OTHER SURGICAL HISTORY  05/17/2021    Colon surgery    OTHER SURGICAL HISTORY  04/06/2021    Laparoscopy    PROSTATE SURGERY  08/24/2016    Prostate  Surgery        Allergies   Allergies   Allergen Reactions    Animal Dander Unknown    Grass Pollen Unknown    Tree And Shrub Pollen Unknown        Home Meds  Current Outpatient Medications   Medication Instructions    azelastine 205.5 mcg (0.15 %) spray,non-aerosol nasal, 2 times daily    CALCIUM CITRATE-VITAMIN D3 ORAL oral    cholecalciferol (Vitamin D-3) 5,000 Units tablet oral    cyanocobalamin (Vitamin B-12) 1,000 mcg tablet oral    omega-3 fatty acids-fish oil 360-1,200 mg capsule oral    omeprazole (PriLOSEC) 20 mg DR capsule oral    oxybutynin (DITROPAN) 5 mg, oral, Daily RT    propranolol (Inderal) 60 mg tablet 1 tablet, oral, Every 12 hours    psyllium (MetamuciL) 0.4 gram capsule oral    rizatriptan (Maxalt) 10 mg tablet     sodium chloride (Deep Sea Nasal) 0.65 % nasal spray nasal    tamsulosin (Flomax) 0.4 mg 24 hr capsule 2 capsules, oral, Daily    Xhance 93 mcg/actuation aerosol breath activated         Family History    Family History   Problem Relation Name Age of Onset    Multiple sclerosis Mother      COPD Father      Hiatal hernia Father      Hypertension Father      Prostate cancer Father      Skin cancer Father      Colon cancer Other Cousin         Social History  Social History     Tobacco Use    Smoking status: Never    Smokeless tobacco: Never   Vaping Use    Vaping Use: Never used   Substance Use Topics    Alcohol use: Never    Drug use: Never        Review Of Systems    Review of Systems    General: Not Present- Obesity, Cancer, HIV, MRSA, Recent Cold/Flu, Tired During the Day and VRE.  HEENT: Not Present- Migraine, Cataracts, Glaucoma, Macular Degeneration and Retinal Detachment.  Respiratory:Not Present- Asthma, Chronic Cough, Difficulty Breathing on Exertion, Difficulty Breathing at Rest, Emphysema, Frequent Bronchitis, Home CPAP/BiPAP, Home Oxygen, Pulmonary Embolus, Pneumonia/TB, Sleep Apnea and Snoring.  Cardiovascular: Not Present- Chest Pain, Congestive Heart Failure, Heart  Attack, Coronary Artery Disease, Heart Stent, Hypertension, Internal Defibrillator, Irregular Heart Beat, Mitral Valve Prolapse, Murmur, Pacemaker and Peripheral Vascular Disease.  Gastrointestinal: Not Present- Heartburn, Hepatitis, Hiatal Hernia, Jaundice, Stomach Ulcer and IBS.  Male Genitourinary: Not Present- Enlarged Prostate, Kidney Failure, Kidney Stones, Dialysis and Urinary Tract Infection.  Musculoskeletal: Not Present- Arthritis, Back Pain and Fibromyalgia.  Neurological: Not Present- Numbness, Tingling, Seizures, Stroke,  Shunt and Weakness.  Psychiatric: Not Present- Anxiety, Bipolar, Depression and Panic Attacks.  Endocrine: Not Present- Diabetes, Hyperthyroidism, Hypothyroidism and Low Blood Sugar.  Hematology: Not Present- Abnormal Bleeding, Anemia and Blood Clots.    Vitals  There were no vitals taken for this visit.     Physical Exam   Skin & Soft Tissue Exam    Integumentary  Global Assessment: Examination of related systems reveals - Well-developed, well-nourished and in no acute distress; alert and oriented x 3,  Neck supple, with no palpable masses, no thyromegaly, No lymphadenopathy and Apocrine and  eccrine glands are normal with no hyperhidrosis.   Upon inspection and palpation of skin surfaces of the - Head/Face: no rashes, ulcers, lesions or evidence of photo damage. No palpable nodules or masses, Neck: no visible lesions or palpable masses, Chest: no swelling,scarring or lesions. No prominent arteries or veins observed, Axillae: non-tender, no inflammation or ulceration, no drainage., Abdomen: no scars, rashes or lesions, Back: normal skin surface, no evidence of  photo damage, no suspicious lesions, Right upper extremity: no lesions or rashes. No evidence of photo damage, Left upper extremity: no lesions or rashes. No evidence of photo damage, Right lower extremity: no stasis ulcers, rashes or suspicious lesions. No evidence of photo damage, Left lower extremity: no stasis ulcers,  rashes or suspicious lesions. No evidence of  photo damage, Distribution of scalp and body hair is normal and No dandruff or other scalp lesions noted.    Upper extremities:     Left: FROM; NVI; just proximal to the antecubital fold, on the medial aspect of the arm, there is an ill-defined fatty soft tissue thickening measuring 3 cm transverse by 2 cm sagittal perhaps 1 cm in depth; this area is tender; there is not a well-defined mobile discrete or dominant mass per se    Right: FROM; NVI; just proximal to the antecubital fold, on the medial aspect of the arm, there is an ill-defined fatty soft tissue thickening measuring 3 cm transverse by 2 cm sagittal perhaps 1 cm in depth; this is similar and symmetric to the contralateral or left arm, but is nontender    Chest and Lung Exam  Chest and lung exam reveals - normal excursion with symmetric chest walls and on auscultation, normal breath sounds, no  adventitious sounds and normal vocal resonance.    Cardiovascular  Cardiovascular examination reveals - normal heart sounds, regular rate and rhythm with no murmurs.    Assessment/Plan   Pastor Stock has ill-defined area of soft tissue thickening in the left antecubital region as described, which is similar and symmetric compared to the contralateral arm.  However, there is associated tenderness on the left side.  On palpation however, this is not a well-defined mass or lipoma.    Recommendation:    Check focused ultrasound to the left antecubital region  Will review report and images, and call patient with management plan

## 2023-10-20 ENCOUNTER — ANCILLARY PROCEDURE (OUTPATIENT)
Dept: RADIOLOGY | Facility: CLINIC | Age: 68
End: 2023-10-20
Payer: MEDICARE

## 2023-10-20 DIAGNOSIS — R22.33: ICD-10-CM

## 2023-10-20 PROCEDURE — 76882 US LMTD JT/FCL EVL NVASC XTR: CPT | Performed by: RADIOLOGY

## 2023-10-20 PROCEDURE — 76881 US COMPL JOINT R-T W/IMG: CPT

## 2023-10-24 ENCOUNTER — DOCUMENTATION (OUTPATIENT)
Dept: SURGERY | Facility: CLINIC | Age: 68
End: 2023-10-24
Payer: MEDICARE

## 2023-10-24 PROBLEM — E78.00 HYPERCHOLESTEROLEMIA: Status: RESOLVED | Noted: 2023-06-12 | Resolved: 2023-10-24

## 2023-10-24 PROBLEM — F33.42 RECURRENT MAJOR DEPRESSIVE DISORDER, IN FULL REMISSION (CMS-HCC): Status: RESOLVED | Noted: 2023-06-12 | Resolved: 2023-10-24

## 2023-10-24 NOTE — PROGRESS NOTES
Left upper extremity ultrasound with focus to the left antecubital region was performed on 10/20/2023 and this was completely normal.  No mass or fluid collection identified.  I personally viewed the report and the images.  I called the patient this morning and informed him of the results.  No surgical intervention is indicated therefore.  I do not have an explanation for the patient's discomfort.  He notes that he will follow-up with his chiropractor this week and will consider physical therapy.  If the discomfort continues, I recommend that he return to see Dr. Zazueta.  He will follow-up with me as needed.

## 2023-12-15 NOTE — PROGRESS NOTES
Subjective        Mitchell Stock. Is 68 y.o.. male. Here for follow up office visit-       Chief Complaint   Patient presents with    Follow-up   Had flu vaccine         HPI:        Follow up for depression, vit d vitb12, migraines, prediabetes , chol , elevated BMI, vit D     pt is doing well       Other medical specialists are involved in patient's care.    Any recent notes that were available were reviewed.    All conditions are monitored, evaluated and assessed regularly.    Patient is compliant with current treatment regimen/medications.    Specialists involved include:           Dr Isaac - ENT    Dr Larry Guardado- manages- hemachromatosis- ferritin , low platelets-      Dr Lefty Mann- 10/24/23- mass forearm- ultrasound was normal              Due for lab       Pt stepped in gopher hole in July and fell    In Nov pt tripped going down stairs while holding container - strained  back and went to chiro       Lab on 10/09/2023   Component Date Value Ref Range Status    J LUIS 10/09/2023 Negative  Negative Final    The Antinuclear Antibody (J LUIS) test was performed using  indirect immunofluorescence assay with HEp-2 cells slide.    WBC 10/09/2023 7.0  4.4 - 11.3 x10*3/uL Final    nRBC 10/09/2023 0.0  0.0 - 0.0 /100 WBCs Final    RBC 10/09/2023 4.73  4.50 - 5.90 x10*6/uL Final    Hemoglobin 10/09/2023 14.8  13.5 - 17.5 g/dL Final    Hematocrit 10/09/2023 44.6  41.0 - 52.0 % Final    MCV 10/09/2023 94  80 - 100 fL Final    MCH 10/09/2023 31.3  26.0 - 34.0 pg Final    MCHC 10/09/2023 33.2  32.0 - 36.0 g/dL Final    RDW 10/09/2023 12.0  11.5 - 14.5 % Final    Platelets 10/09/2023 239  150 - 450 x10*3/uL Final    MPV 10/09/2023 10.0  7.5 - 11.5 fL Final    Citrulline Antibody, IgG 10/09/2023 <1  <3 U/mL Final    NEGATIVE < 3 U/ML  POSITIVE >=3 U/ML    C-Reactive Protein 10/09/2023 0.18  <1.00 mg/dL Final    Rheumatoid Factor 10/09/2023 <10  0 - 15 IU/mL Final    Sedimentation Rate  10/09/2023 8  0 - 20 mm/h Final    Creatine Kinase 10/09/2023 46  0 - 325 U/L Final   Orders Only on 08/10/2023   Component Date Value Ref Range Status    PSA 08/10/2023 3.90  0.00 - 4.00 ng/mL Final    Comment: The FDA requires that the method used for PSA assay be   reported to the physician. Values obtained with different   assay methods must not be used interchangeably. This test   was performed at [facility] (formerly [facility]) using the Siemens  FidusNet PSA method, which is a sandwich immunoassay using   chemiluminescence for quantitation. The assay is approved  for measurement of prostate-specific antigen (PSA) in   serum and may be used in conjunction with a digital rectal  examination in men 50 years and older as an aid in   detection of prostate cancer.   5-Alpha-reductase inhibitors (e.g. Proscar, Finasteride,   Avodart, Dutasteride and Betzaida) for the treatment of BPH   have been shown to lower PSA levels by an average of 50%   after 6 months of treatment.      Glucose 08/10/2023 87  74 - 99 mg/dL Final    Sodium 08/10/2023 141  136 - 145 mmol/L Final    Potassium 08/10/2023 4.0  3.5 - 5.3 mmol/L Final    Chloride 08/10/2023 107  98 - 107 mmol/L Final    Bicarbonate 08/10/2023 24  21 - 32 mmol/L Final    Anion Gap 08/10/2023 14  10 - 20 mmol/L Final    Urea Nitrogen 08/10/2023 13  6 - 23 mg/dL Final    Creatinine 08/10/2023 0.90  0.50 - 1.30 mg/dL Final    GFR MALE 08/10/2023 >90  >90 mL/min/1.73m2 Final    Comment:  CALCULATIONS OF ESTIMATED GFR ARE PERFORMED   USING THE 2021 CKD-EPI STUDY REFIT EQUATION   WITHOUT THE RACE VARIABLE FOR THE IDMS-TRACEABLE   CREATININE METHODS.    https://jasn.asnjournals.org/content/early/2021/09/22/ASN.3062252222      Calcium 08/10/2023 9.4  8.6 - 10.6 mg/dL Final    Albumin 08/10/2023 4.5  3.4 - 5.0 g/dL Final    Alkaline Phosphatase 08/10/2023 80  33 - 136 U/L Final    Total Protein 08/10/2023 7.2  6.4 - 8.2 g/dL Final    AST 08/10/2023 19  9 - 39 U/L Final    Total  Bilirubin 08/10/2023 0.6  0.0 - 1.2 mg/dL Final    ALT (SGPT) 08/10/2023 23  10 - 52 U/L Final    Comment:  Patients treated with Sulfasalazine may generate    falsely decreased results for ALT.      Ferritin 08/10/2023 35  20 - 300 ug/L Final    WBC 08/10/2023 6.0  4.4 - 11.3 x10E9/L Final    nRBC 08/10/2023 0.0  0.0 - 0.0 /100 WBC Final    RBC 08/10/2023 4.76  4.50 - 5.90 x10E12/L Final    Hemoglobin 08/10/2023 15.3  13.5 - 17.5 g/dL Final    Hematocrit 08/10/2023 45.1  41.0 - 52.0 % Final    MCV 08/10/2023 95  80 - 100 fL Final    MCHC 08/10/2023 33.9  32.0 - 36.0 g/dL Final    Platelets 08/10/2023 216  150 - 450 x10E9/L Final    RDW 08/10/2023 11.9  11.5 - 14.5 % Final    Neutrophils % 08/10/2023 45.6  40.0 - 80.0 % Final    Immature Granulocytes %, Automated 08/10/2023 0.3  0.0 - 0.9 % Final    Comment:  Immature Granulocyte Count (IG) includes promyelocytes,    myelocytes and metamyelocytes but does not include bands.   Percent differential counts (%) should be interpreted in the   context of the absolute cell counts (cells/L).      Lymphocytes % 08/10/2023 41.1  13.0 - 44.0 % Final    Monocytes % 08/10/2023 9.4  2.0 - 10.0 % Final    Eosinophils % 08/10/2023 2.8  0.0 - 6.0 % Final    Basophils % 08/10/2023 0.8  0.0 - 2.0 % Final    Neutrophils Absolute 08/10/2023 2.72  1.20 - 7.70 x10E9/L Final    Lymphocytes Absolute 08/10/2023 2.46  1.20 - 4.80 x10E9/L Final    Monocytes Absolute 08/10/2023 0.56  0.10 - 1.00 x10E9/L Final    Eosinophils Absolute 08/10/2023 0.17  0.00 - 0.70 x10E9/L Final    Basophils Absolute 08/10/2023 0.05  0.00 - 0.10 x10E9/L Final    Iron 08/10/2023 105  35 - 150 ug/dL Final    TIBC 08/10/2023 321  240 - 445 ug/dL Final    Iron Saturation 08/10/2023 33  25 - 45 % Final   Lab on 06/16/2023   Component Date Value Ref Range Status    Vitamin D, 25-Hydroxy 06/16/2023 67  ng/mL Final    .  DEFICIENCY:         < 20   NG/ML  INSUFFICIENCY:      20-29  NG/ML  SUFFICIENCY:          NG/ML    THIS ASSAY ACCURATELY QUANTIFIES THE SUM OF  VITAMIN D3, 25-HYDROXY AND VIT D2,25-HYDROXY.    Vitamin B-12 06/16/2023 514  211 - 911 pg/mL Final    Albumin 06/16/2023 4.5  3.4 - 5.0 g/dL Final    Total Bilirubin 06/16/2023 0.6  0.0 - 1.2 mg/dL Final    Bilirubin, Direct 06/16/2023 0.1  0.0 - 0.3 mg/dL Final    Alkaline Phosphatase 06/16/2023 84  33 - 136 U/L Final    ALT (SGPT) 06/16/2023 20  10 - 52 U/L Final     Patients treated with Sulfasalazine may generate    falsely decreased results for ALT.    AST 06/16/2023 17  9 - 39 U/L Final    Total Protein 06/16/2023 7.1  6.4 - 8.2 g/dL Final    Cholesterol 06/16/2023 217 (H)  0 - 199 mg/dL Final    .      AGE      DESIRABLE   BORDERLINE HIGH   HIGH     0-19 Y     0 - 169       170 - 199     >/= 200    20-24 Y     0 - 189       190 - 224     >/= 225         >24 Y     0 - 199       200 - 239     >/= 240   **All ranges are based on fasting samples. Specific   therapeutic targets will vary based on patient-specific   cardiac risk.  .   Pediatric guidelines reference:Pediatrics 2011, 128(S5).   Adult guidelines reference: NCEP ATPIII Guidelines,     J LUIS 2001, 258:2486-97  .   Venipuncture immediately after or during the    administration of Metamizole may lead to falsely   low results. Testing should be performed immediately   prior to Metamizole dosing.    HDL 06/16/2023 50.4  mg/dL Final    .      AGE      VERY LOW   LOW     NORMAL    HIGH       0-19 Y       < 35   < 40     40-45     ----    20-24 Y       ----   < 40       >45     ----      >24 Y       ----   < 40     40-60      >60  .    Cholesterol/HDL Ratio 06/16/2023 4.3   Final    REF VALUES  DESIRABLE  < 3.4  HIGH RISK  > 5.0    LDL 06/16/2023 145 (H)  0 - 99 mg/dL Final    .                           NEAR      BORD      AGE      DESIRABLE  OPTIMAL    HIGH     HIGH     VERY HIGH     0-19 Y     0 - 109     ---    110-129   >/= 130     ----    20-24 Y     0 - 119     ---    120-159   >/= 160     ----       >24 Y     0 -  99   100-129  130-159   160-189     >/=190  .    VLDL 06/16/2023 22  0 - 40 mg/dL Final    Triglycerides 06/16/2023 109  0 - 149 mg/dL Final    .      AGE      DESIRABLE   BORDERLINE HIGH   HIGH     VERY HIGH   0 D-90 D    19 - 174         ----         ----        ----  91 D- 9 Y     0 -  74        75 -  99     >/= 100      ----    10-19 Y     0 -  89        90 - 129     >/= 130      ----    20-24 Y     0 - 114       115 - 149     >/= 150      ----         >24 Y     0 - 149       150 - 199    200- 499    >/= 500  .   Venipuncture immediately after or during the    administration of Metamizole may lead to falsely   low results. Testing should be performed immediately   prior to Metamizole dosing.    Hemoglobin A1C 06/16/2023 5.2  % Final         Diagnosis of Diabetes-Adults   Non-Diabetic: < or = 5.6%   Increased risk for developing diabetes: 5.7-6.4%   Diagnostic of diabetes: > or = 6.5%  .       Monitoring of Diabetes                Age (y)     Therapeutic Goal (%)   Adults:          >18           <7.0   Pediatrics:    13-18           <7.5                   7-12           <8.0                   0- 6            7.5-8.5   American Diabetes Association. Diabetes Care 33(S1), Jan 2010.    Estimated Average Glucose 06/16/2023 103  MG/DL Final   Legacy Encounter on 02/16/2023   Component Date Value Ref Range Status    Glucose 02/16/2023 91  74 - 99 mg/dL Final    Sodium 02/16/2023 139  136 - 145 mmol/L Final    Potassium 02/16/2023 4.1  3.5 - 5.3 mmol/L Final    Chloride 02/16/2023 105  98 - 107 mmol/L Final    Bicarbonate 02/16/2023 20 (L)  21 - 32 mmol/L Final    Anion Gap 02/16/2023 18  10 - 20 mmol/L Final    Urea Nitrogen 02/16/2023 17  6 - 23 mg/dL Final    Creatinine 02/16/2023 0.84  0.50 - 1.30 mg/dL Final    GFR MALE 02/16/2023 >90  >90 mL/min/1.73m2 Final    Comment:  CALCULATIONS OF ESTIMATED GFR ARE PERFORMED   USING THE 2021 CKD-EPI STUDY REFIT EQUATION   WITHOUT THE RACE VARIABLE FOR THE  IDMS-TRACEABLE   CREATININE METHODS.    https://jasn.asnjournals.org/content/early/2021/09/22/ASN.0296142274      Calcium 02/16/2023 8.9  8.6 - 10.6 mg/dL Final    Albumin 02/16/2023 3.9  3.4 - 5.0 g/dL Final    Alkaline Phosphatase 02/16/2023 61  33 - 136 U/L Final    Total Protein 02/16/2023 6.2 (L)  6.4 - 8.2 g/dL Final    AST 02/16/2023 13  9 - 39 U/L Final    Total Bilirubin 02/16/2023 0.5  0.0 - 1.2 mg/dL Final    ALT (SGPT) 02/16/2023 17  10 - 52 U/L Final    Comment:  Patients treated with Sulfasalazine may generate    falsely decreased results for ALT.      Ferritin 02/16/2023 58  20 - 300 ug/L Final    PSA 02/16/2023 3.93  0.00 - 4.00 ng/mL Final    Comment: The FDA requires that the method used for PSA assay be   reported to the physician. Values obtained with different   assay methods must not be used interchangeably. This test   was performed at [facility] using the Siemens  [employer] PSA method, which is a sandwich immunoassay using   chemiluminescence for quantitation. The assay is approved  for measurement of prostate-specific antigen (PSA) in   serum and may be used in conjunction with a digital rectal  examination in men 50 years and older as an aid in   detection of prostate cancer.   5-Alpha-reductase inhibitors (e.g. Proscar, Finasteride,   Avodart, Dutasteride and Betzaida) for the treatment of BPH   have been shown to lower PSA levels by an average of 50%   after 6 months of treatment.      WBC 02/16/2023 8.2  4.4 - 11.3 x10E9/L Final    nRBC 02/16/2023 0.0  0.0 - 0.0 /100 WBC Final    RBC 02/16/2023 4.70  4.50 - 5.90 x10E12/L Final    Hemoglobin 02/16/2023 15.0  13.5 - 17.5 g/dL Final    Hematocrit 02/16/2023 45.5  41.0 - 52.0 % Final    MCV 02/16/2023 97  80 - 100 fL Final    MCHC 02/16/2023 33.0  32.0 - 36.0 g/dL Final    Platelets 02/16/2023 200  150 - 450 x10E9/L Final    RDW 02/16/2023 12.0  11.5 - 14.5 % Final    Neutrophils % 02/16/2023 58.7  40.0 - 80.0 % Final     Immature Granulocytes %, Automated 02/16/2023 0.5  0.0 - 0.9 % Final    Comment:  Immature Granulocyte Count (IG) includes promyelocytes,    myelocytes and metamyelocytes but does not include bands.   Percent differential counts (%) should be interpreted in the   context of the absolute cell counts (cells/L).      Lymphocytes % 02/16/2023 30.0  13.0 - 44.0 % Final    Monocytes % 02/16/2023 7.3  2.0 - 10.0 % Final    Eosinophils % 02/16/2023 3.0  0.0 - 6.0 % Final    Basophils % 02/16/2023 0.5  0.0 - 2.0 % Final    Neutrophils Absolute 02/16/2023 4.82  1.20 - 7.70 x10E9/L Final    Lymphocytes Absolute 02/16/2023 2.47  1.20 - 4.80 x10E9/L Final    Monocytes Absolute 02/16/2023 0.60  0.10 - 1.00 x10E9/L Final    Eosinophils Absolute 02/16/2023 0.25  0.00 - 0.70 x10E9/L Final    Basophils Absolute 02/16/2023 0.04  0.00 - 0.10 x10E9/L Final    Iron 02/16/2023 106  35 - 150 ug/dL Final    TIBC 02/16/2023 260  240 - 445 ug/dL Final    Iron Saturation 02/16/2023 41  25 - 45 % Final         REVIEW OF SYSTEMS:        Objective      Vitals:    12/18/23 0852   BP: 130/77   BP Location: Right arm   Patient Position: Sitting   BP Cuff Size: Adult   Pulse: 56   Resp: 16   Temp: 36.3 °C (97.4 °F)   TempSrc: Temporal   Weight: 99.6 kg (219 lb 8 oz)       PHYSICAL:      Patient is alert and oriented x 3 , NAD  HEAD- normocephalic and atraumatic   EYES-conjunctiva-normal, lids - normal  EARS/NOSE- normal external exam   NECK-supple,FROM  CV- RRR without murmur  PULM- CTA bilaterally, normal respiratory effort  RESPIRATORY EFFORT- normal , no retractions or nasal flaring   ABD- normoactive BS's   EXT- no edema,NT  SKIN- no abnormal skin lesions noted  NEURO- no focal deficits  PSYCH- pleasant, normal judgement and insight      BP Readings from Last 3 Encounters:   12/18/23 130/77   10/17/23 156/85   10/09/23 137/76             Wt Readings from Last 3 Encounters:   12/18/23 99.6 kg (219 lb 8 oz)   10/17/23 99.3 kg (219 lb)   10/09/23  99.2 kg (218 lb 9.6 oz)           BMI Readings from Last 3 Encounters:   12/18/23 31.49 kg/m²   10/17/23 31.42 kg/m²   10/09/23 31.37 kg/m²               The number and complexity of problems addressed is considered moderate.  The amount and/or complexity of data reviewed and analyzed is considered moderate. The risk of complications and/or morbidity/mortality of patient is considered moderate. Overall, this patient encounter is considered a moderate risk visit.      Common Side Effects- Beta-blockers:    Dizziness  Feeling tired  Feeling lightheaded  Vision problems  Swelling of the hands, feet, ankles, or legs  Decreased sexual ability    Call your doctor if you have any of these signs:  Chest pain  Irregular heartbeat  Painful erection in men      Assessment/Plan      Problem List Items Addressed This Visit          Active Problems    Allergic rhinitis - Primary    Hemochromatosis, unspecified    ELISEO (obstructive sleep apnea)    Prediabetes    Relevant Orders    Hemoglobin A1C    Vitamin B12 deficiency    Relevant Orders    Vitamin D 25-Hydroxy,Total (for eval of Vitamin D levels)    Vitamin D deficiency    Relevant Orders    Vitamin B12     Other Visit Diagnoses       Hypercholesterolemia        Relevant Orders    Hepatic Function Panel    Lipid Panel    Follow Up In [facility] - PCP - Established    Recurrent major depressive disorder, in full remission (CMS/HCC)        Class 1 obesity due to excess calories with body mass index (BMI) of 31.0 to 31.9 in adult, unspecified whether serious comorbidity present        Body mass index (BMI) 31.0-31.9, adult        Relevant Orders    Vitamin D 25-Hydroxy,Total (for eval of Vitamin D levels)            Orders Placed This Encounter   Procedures    Hepatic Function Panel    Lipid Panel    Hemoglobin A1C    Vitamin B12    Vitamin D 25-Hydroxy,Total (for eval of Vitamin D levels)       Increase activity         Continue medication      Ordered lab      Follow  up in 6 months

## 2023-12-18 ENCOUNTER — OFFICE VISIT (OUTPATIENT)
Dept: PRIMARY CARE | Facility: CLINIC | Age: 68
End: 2023-12-18
Payer: MEDICARE

## 2023-12-18 ENCOUNTER — LAB (OUTPATIENT)
Dept: LAB | Facility: LAB | Age: 68
End: 2023-12-18
Payer: MEDICARE

## 2023-12-18 VITALS
DIASTOLIC BLOOD PRESSURE: 77 MMHG | BODY MASS INDEX: 31.49 KG/M2 | TEMPERATURE: 97.4 F | RESPIRATION RATE: 16 BRPM | SYSTOLIC BLOOD PRESSURE: 130 MMHG | WEIGHT: 219.5 LBS | HEART RATE: 56 BPM

## 2023-12-18 DIAGNOSIS — E53.8 VITAMIN B12 DEFICIENCY: ICD-10-CM

## 2023-12-18 DIAGNOSIS — R73.03 PREDIABETES: ICD-10-CM

## 2023-12-18 DIAGNOSIS — G47.33 OSA (OBSTRUCTIVE SLEEP APNEA): ICD-10-CM

## 2023-12-18 DIAGNOSIS — E78.00 HYPERCHOLESTEROLEMIA: ICD-10-CM

## 2023-12-18 DIAGNOSIS — J30.9 ALLERGIC RHINITIS, UNSPECIFIED SEASONALITY, UNSPECIFIED TRIGGER: Primary | ICD-10-CM

## 2023-12-18 DIAGNOSIS — E66.09 CLASS 1 OBESITY DUE TO EXCESS CALORIES WITH BODY MASS INDEX (BMI) OF 31.0 TO 31.9 IN ADULT, UNSPECIFIED WHETHER SERIOUS COMORBIDITY PRESENT: ICD-10-CM

## 2023-12-18 DIAGNOSIS — E83.119 HEMOCHROMATOSIS, UNSPECIFIED HEMOCHROMATOSIS TYPE: ICD-10-CM

## 2023-12-18 DIAGNOSIS — F33.42 RECURRENT MAJOR DEPRESSIVE DISORDER, IN FULL REMISSION (CMS-HCC): ICD-10-CM

## 2023-12-18 DIAGNOSIS — E55.9 VITAMIN D DEFICIENCY: ICD-10-CM

## 2023-12-18 LAB
25(OH)D3 SERPL-MCNC: 70 NG/ML (ref 30–100)
ALBUMIN SERPL BCP-MCNC: 4.7 G/DL (ref 3.4–5)
ALP SERPL-CCNC: 76 U/L (ref 33–136)
ALT SERPL W P-5'-P-CCNC: 21 U/L (ref 10–52)
AST SERPL W P-5'-P-CCNC: 18 U/L (ref 9–39)
BILIRUB DIRECT SERPL-MCNC: 0.1 MG/DL (ref 0–0.3)
BILIRUB SERPL-MCNC: 0.7 MG/DL (ref 0–1.2)
CHOLEST SERPL-MCNC: 217 MG/DL (ref 0–199)
CHOLESTEROL/HDL RATIO: 4.7
EST. AVERAGE GLUCOSE BLD GHB EST-MCNC: 103 MG/DL
HBA1C MFR BLD: 5.2 %
HDLC SERPL-MCNC: 46.3 MG/DL
LDLC SERPL CALC-MCNC: 130 MG/DL
NON HDL CHOLESTEROL: 171 MG/DL (ref 0–149)
PROT SERPL-MCNC: 7.3 G/DL (ref 6.4–8.2)
TRIGL SERPL-MCNC: 202 MG/DL (ref 0–149)
VIT B12 SERPL-MCNC: 614 PG/ML (ref 211–911)
VLDL: 40 MG/DL (ref 0–40)

## 2023-12-18 PROCEDURE — 82306 VITAMIN D 25 HYDROXY: CPT

## 2023-12-18 PROCEDURE — 80061 LIPID PANEL: CPT

## 2023-12-18 PROCEDURE — 1160F RVW MEDS BY RX/DR IN RCRD: CPT | Performed by: FAMILY MEDICINE

## 2023-12-18 PROCEDURE — 83036 HEMOGLOBIN GLYCOSYLATED A1C: CPT

## 2023-12-18 PROCEDURE — 82607 VITAMIN B-12: CPT

## 2023-12-18 PROCEDURE — 1159F MED LIST DOCD IN RCRD: CPT | Performed by: FAMILY MEDICINE

## 2023-12-18 PROCEDURE — 3008F BODY MASS INDEX DOCD: CPT | Performed by: FAMILY MEDICINE

## 2023-12-18 PROCEDURE — 1125F AMNT PAIN NOTED PAIN PRSNT: CPT | Performed by: FAMILY MEDICINE

## 2023-12-18 PROCEDURE — 36415 COLL VENOUS BLD VENIPUNCTURE: CPT

## 2023-12-18 PROCEDURE — 99214 OFFICE O/P EST MOD 30 MIN: CPT | Performed by: FAMILY MEDICINE

## 2023-12-18 PROCEDURE — 80076 HEPATIC FUNCTION PANEL: CPT

## 2023-12-18 PROCEDURE — 1036F TOBACCO NON-USER: CPT | Performed by: FAMILY MEDICINE

## 2023-12-18 RX ORDER — ZINC GLUCONATE 50 MG
TABLET ORAL EVERY 24 HOURS
COMMUNITY

## 2023-12-18 RX ORDER — MAGNESIUM 250 MG
250 TABLET ORAL 2 TIMES DAILY
COMMUNITY

## 2023-12-18 ASSESSMENT — PATIENT HEALTH QUESTIONNAIRE - PHQ9
1. LITTLE INTEREST OR PLEASURE IN DOING THINGS: NOT AT ALL
SUM OF ALL RESPONSES TO PHQ9 QUESTIONS 1 AND 2: 0
2. FEELING DOWN, DEPRESSED OR HOPELESS: NOT AT ALL

## 2023-12-20 ENCOUNTER — APPOINTMENT (OUTPATIENT)
Dept: OTOLARYNGOLOGY | Facility: CLINIC | Age: 68
End: 2023-12-20
Payer: MEDICARE

## 2023-12-22 DIAGNOSIS — J32.4 CHRONIC PANSINUSITIS: Primary | ICD-10-CM

## 2023-12-22 RX ORDER — FLUTICASONE PROPIONATE 93 UG/1
SPRAY, METERED NASAL
Qty: 16 ML | Refills: 11 | Status: SHIPPED | OUTPATIENT
Start: 2023-12-22 | End: 2024-02-22 | Stop reason: ALTCHOICE

## 2023-12-27 NOTE — PROGRESS NOTES
"[facility]   12/28/23 - Patient presents for a followup visit. He reports good nasal breathing well. He is still doing saline irritations twice daily but notes dripping after. He also reports some irritation somewhere between his nose and throat, which may be from irrigations. He is using Xhance and Flonase. He is doing saline irrigations twice daily. He reports having the best allergy season in 60 years. He has only needed to take 1 Claritin since May 2023.    Operative Report:   Date of service: 5/15/2023   Site of Service: [facility] [place]     Postoperative Diagnoses:  1. Bilateral chronic pansinusitis  2. Nasal septal deviation  3. Bilateral inferior turbinate hypertrophy  4. Nasal obstruction and drainage  5. Bilateral leo bullosa     Operation/Procedures:   1. Bilateral endoscopic total ethmoidectomies with sphenoidotomies   2. Bilateral endoscopic frontal sinusotomies with frontal sinus explorations   3. Bilateral endoscopic maxillary antrostomies with removal of tissue from sinuses   4. Bilateral endoscopic conchal bullosa resections  5. Septoplasty   6. Bilateral inferior turbinate submucous resection   7. Imaged guidance navigation - extradural     Operative Findings:  1. Chronic inflammation through all sinuses with inflammatory disease - worse in right max with extensive purulence - removal of middle turbinates/leo bullosa bilaterally  2. Hay Splint sutured to the septum bilaterally   3. Absorbable hemostatic material in the ethmoids     Surgeon: Apolinar Isaac MD  Assistant: MD Charli (resident)  EBL: 300 ml  Anesthesia: General and local    Review of Systems   Negative for constitutional, eyes, cardiac, pulmonary, hepatic, renal, digestive, hematologic, epileptic, syncopal, musculoskeletal, mental health, integumentary, hypertensive, lipid, arthritic, diabetic, thyroid or neurologic disorders (except as listed in the HPI, PMH and Problem List).       Assessment   Mitchell Stock \"Enrique\" is a " 68 y.o. male h/o bilateral chronic pansinusitis, DNS, ITH, bilateral CB, nasal obstruction/drainage s/p FESS (total), septoplasty, bilateral ITR, bilateral CB resection, and removal of middle turbinates. The patient had surgery 5/15/2023.  5/26/23 - 1st post op. Expected post op swelling today. Debridement performed as above. Continue saline irrigations at least BID with Xhance after.  6/9/23 - 2nd post op. Some whitish drainage on the right side. Rx mupirocin irrigations. Continue with Xhance after irrigations.   8/10/23 - Sinuses PRISTINE. Reduce Xhance to 1 spray daily for 2 months. Then, reduce to 1 spray every other day with Flonase at intervening times.  12/28/23 - Sinuses PRISTINE. Continue Xhance 1 spray every other day until finished. Continue Flonase. Continue saline irrig once or twice daily per patient preference.    Plan   I personally reviewed the note from Dr. Anitha Zazueta's office from 12/18/2023. This is contributing to my assessment and plan: The patient was diagnosed with allergic rhinitis and was recommended to continue the nasal sprays.    Nasal endoscopy. Findings: as noted.  Continue Xhance, 1 spray in each nostril every other day until finished and then stop.  Continue Flonase, 1 spray in each nostril every other day when not using Xhance. After finishing Xhance, continue Flonase and increase to 1 spray in each nostril once or twice daily after irrigating (depending on how many irrigations)  Continue saline irrigations once or twice daily.  Reassurance provided to patient, the nose has healed well.  Follow up in 3-4 months.    Apolinar Isaac MD [facility]  Division of Rhinology, Sinus, and Skull Base Surgery       Exam   General: This is a healthy appearing male who appears his stated age. The patient is alert and appropriately verbally conversant without hoarseness.  Face: The face was inspected and no cutaneous masses or lesions were visualized. There was no erythema or edema noted. Facial movement  was symmetric without weakness. No skin lesions were detected.  Eyes: Extra-ocular muscle function was intact. No nystagmus was observed. Pupils were equal.     Nose: Examination of the nose revealed the nasal dorsum to be midline. Intranasal exam reveals the septum is healthy without perforation. The inferior turbinates were expectedly edematous. Crusts and dried secretions noted on anterior rhinoscopy. See below procedure note as applicable for further exam.    Procedure Note:  Procedure: Nasal endoscopy - diagnostic  Indication: Chronic rhinosinusitis, post operative from sinus surgery  Informed consent obtained: risks, benefits, alternatives, and expectations discussed with patient and the patient wishes to proceed.    Findings: After anesthesia and decongestion with topical lidocaine and Afrin spray, the nasal cavities were examined with a zero and/or 30-degree endoscope. Nasal cavities are clear. Sphenoidotomies are clear. The maxillary and ethmoid sinuses are widely patent. The frontal recesses were clear. Frontal sinusotomies widely patent. Sinuses are again PRISTINE. Otherwise the inferior, middle, superior turbinates and meatuses, sphenoethmoid recess, nasopharynx, nasal cavity and septum were all normal. No pus or polyps were noted. There is no CSF leak. The patient tolerated the procedure well and there were no complications.       Scribe Attestation  By signing my name below, I, [place] Rod, attest that this documentation has been prepared under the direction and in the presence of Apolinar Isaac MD. All medical record entries made by the Scribe were at my direction or personally dictated by me. I have reviewed the chart and agree that the record accurately reflects my personal performance of the history, physical exam, discussion and plan.

## 2023-12-27 NOTE — PATIENT INSTRUCTIONS
Continue using Xhance. Do 1 spray in each nostril every other day. Finish your current bottle of Xhance and then you only need to use Flonase.    Please continue using Flonase. Do 1 spray in each nostril on the days that you are not using Xhance. After finishing Xhance, continue using Flonase, 1 spray in each nostril once daily. If you decide to do saline irrigations twice daily, use Flonase 1 spray in each nostril twice daily.     Continue doing saline irrigations once or twice daily. If you decide to do a saline irrigation, please irrigate first before using Xhance or Flonase. Otherwise, you will wash the nasal spray out of your nose with the irrigation.     Recipe to Make your Own Nasal Saline Solution:  Mix 8 ounces of distilled water or tap water (be sure to boil it then let it cool down) with 1/2 teaspoon of baking soda and 1/2 teaspoon of table salt and shake bottle to dissolve.     Please feel free to contact my office by calling [phone] with any questions.    Scribe Attestation  By signing my name below, I, [place] Rod, attest that this documentation has been prepared under the direction and in the presence of Apolinar Isaac MD. All medical record entries made by the Scribe were at my direction or personally dictated by me. I have reviewed the chart and agree that the record accurately reflects my personal performance of the history, physical exam, discussion and plan.

## 2023-12-28 ENCOUNTER — OFFICE VISIT (OUTPATIENT)
Dept: OTOLARYNGOLOGY | Facility: CLINIC | Age: 68
End: 2023-12-28
Payer: MEDICARE

## 2023-12-28 VITALS — BODY MASS INDEX: 30.78 KG/M2 | HEIGHT: 70 IN | WEIGHT: 215 LBS

## 2023-12-28 DIAGNOSIS — J32.4 CHRONIC PANSINUSITIS: Primary | ICD-10-CM

## 2023-12-28 PROBLEM — Z85.828 HISTORY OF MALIGNANT NEOPLASM OF SKIN: Status: ACTIVE | Noted: 2023-12-28

## 2023-12-28 PROBLEM — G64 DISORDER OF PERIPHERAL NERVOUS SYSTEM: Status: ACTIVE | Noted: 2023-12-28

## 2023-12-28 PROBLEM — G62.9 NEUROPATHY: Status: ACTIVE | Noted: 2023-12-28

## 2023-12-28 PROBLEM — R79.0 LOW FERRITIN LEVEL: Status: ACTIVE | Noted: 2023-12-28

## 2023-12-28 PROBLEM — E66.9 OBESITY WITH BODY MASS INDEX 30 OR GREATER: Status: ACTIVE | Noted: 2023-12-28

## 2023-12-28 PROBLEM — K21.9 GASTROESOPHAGEAL REFLUX DISEASE WITHOUT ESOPHAGITIS: Status: ACTIVE | Noted: 2023-05-15

## 2023-12-28 PROBLEM — E66.9 OBESITY DUE TO ENERGY IMBALANCE: Status: ACTIVE | Noted: 2023-12-28

## 2023-12-28 PROBLEM — R09.81 NASAL CONGESTION: Status: ACTIVE | Noted: 2023-12-28

## 2023-12-28 PROBLEM — M25.50 ARTHRALGIA: Status: ACTIVE | Noted: 2023-10-09

## 2023-12-28 PROBLEM — R25.2 CRAMPS OF LOWER EXTREMITY: Status: ACTIVE | Noted: 2023-12-28

## 2023-12-28 PROBLEM — R68.89 DISORDER INVOLVING FACE: Status: ACTIVE | Noted: 2023-05-15

## 2023-12-28 PROBLEM — J34.89 NASAL CRUSTING: Status: ACTIVE | Noted: 2023-12-28

## 2023-12-28 PROBLEM — K57.32 DIVERTICULITIS OF COLON: Status: ACTIVE | Noted: 2021-04-02

## 2023-12-28 PROBLEM — G54.9 NERVE ROOT DISORDER: Status: ACTIVE | Noted: 2023-12-28

## 2023-12-28 PROBLEM — M79.603 PAIN OF UPPER EXTREMITY: Status: ACTIVE | Noted: 2023-10-09

## 2023-12-28 PROBLEM — T45.2X1A VITAMIN D TOXICITY: Status: RESOLVED | Noted: 2023-10-09 | Resolved: 2023-12-28

## 2023-12-28 PROBLEM — Z86.59 HISTORY OF DEPRESSION: Status: ACTIVE | Noted: 2023-12-28

## 2023-12-28 PROBLEM — R22.30 MASS OF UPPER EXTREMITY: Status: ACTIVE | Noted: 2023-12-28

## 2023-12-28 PROBLEM — M79.10 MUSCLE PAIN: Status: ACTIVE | Noted: 2023-10-09

## 2023-12-28 PROCEDURE — 99213 OFFICE O/P EST LOW 20 MIN: CPT | Performed by: OTOLARYNGOLOGY

## 2023-12-28 PROCEDURE — 1159F MED LIST DOCD IN RCRD: CPT | Performed by: OTOLARYNGOLOGY

## 2023-12-28 PROCEDURE — 1036F TOBACCO NON-USER: CPT | Performed by: OTOLARYNGOLOGY

## 2023-12-28 PROCEDURE — 3008F BODY MASS INDEX DOCD: CPT | Performed by: OTOLARYNGOLOGY

## 2023-12-28 PROCEDURE — 1160F RVW MEDS BY RX/DR IN RCRD: CPT | Performed by: OTOLARYNGOLOGY

## 2023-12-28 PROCEDURE — 31231 NASAL ENDOSCOPY DX: CPT | Performed by: OTOLARYNGOLOGY

## 2023-12-28 PROCEDURE — 1125F AMNT PAIN NOTED PAIN PRSNT: CPT | Performed by: OTOLARYNGOLOGY

## 2023-12-28 RX ORDER — FLUTICASONE PROPIONATE 50 MCG
1 SPRAY, SUSPENSION (ML) NASAL EVERY OTHER DAY
COMMUNITY

## 2023-12-28 RX ORDER — OMEGA-3-ACID ETHYL ESTERS 1 G/1
1 CAPSULE, LIQUID FILLED ORAL DAILY
COMMUNITY
End: 2023-12-28 | Stop reason: SDUPTHER

## 2023-12-28 RX ORDER — ZONISAMIDE 50 MG/1
50 CAPSULE ORAL
COMMUNITY
End: 2024-01-08 | Stop reason: WASHOUT

## 2024-01-05 NOTE — PROGRESS NOTES
Subjective      HPI:    Mitchell Stock. Is 68 y.o.. male. Here for acute visit-             Chief Complaint   Patient presents with    Cough     X2 weeks    Sore Throat    Immunizations     Previously vaccinated for influenza         What concern/ problem/pain/symptom  brings you here today?  Cough      how long has pt had sxs? 2 weeks      describe symptoms-cough - deep dry and harsh, prod at times      No fever      COVID, flu and RSV is neg       Are symptoms all day ?  Yes  Do symptoms occur at night?  Yes      has pt tried anything for current symptoms, including medications (OTC or prescription)  ?   Delsym      has pt been seen recently for this problem ( within past 2-3 weeks) ? [facility]    if yes- where?    by who?    what treatment was provided?  No Antibiotic , just tessalon perles and bromfed       Social History     Tobacco Use   Smoking Status Never    Passive exposure: Never   Smokeless Tobacco Never        reports no history of alcohol use.       Objective            Vitals:    01/08/24 0824   BP: 118/63   BP Location: Left arm   Patient Position: Sitting   BP Cuff Size: Large adult   Pulse: 61   Temp: 36.8 °C (98.3 °F)   TempSrc: Temporal   SpO2: 96%   Weight: 99.2 kg (218 lb 12.8 oz)           PHYSICAL:      CONSTITUTIONAL- NAD, Pt is A and O x3, Vital signs are within normal limits, well- hydrated, non-toxic   General Appearance- normal , good hygiene, talks easily  EYES- conjunctiva and lids- normal  EARS/NOSE-TM's normal, head normocephalic and atraumatic, nasopharynx-no nasal discharge, no trismus, no hot potato voice, oropharynx- normal  NECK- supple, FROM  LYMPH- no cervical lymph nodes palpated   CV- RRR without murmur  PULM- mild rhonchi, no wheezes bilaterally       Respiratory effort- normal respiratory effort , no retractions or nasal flaring   ABDOMEN- normoactive BS's , soft , NT, no hepatosplenomegaly palpated  EXTREMITIES- no edema or varicosities           SKIN- no abnormal  skin lesions on inspection or palpation   NEURO- no focal deficits  PSYCH- pleasant, normal judgement and insight              BP Readings from Last 3 Encounters:   01/08/24 118/63   12/18/23 130/77   10/17/23 156/85         Wt Readings from Last 3 Encounters:   01/08/24 99.2 kg (218 lb 12.8 oz)   12/28/23 97.5 kg (215 lb)   12/18/23 99.6 kg (219 lb 8 oz)       BMI Readings from Last 3 Encounters:   01/08/24 31.39 kg/m²   12/28/23 30.85 kg/m²   12/18/23 31.49 kg/m²           The number and complexity of problems addressed is considered moderate.  The amount and/or complexity of data reviewed and analyzed is considered moderate. The risk of complications and/or morbidity/mortality of patient is considered moderate. Overall, this patient encounter is considered a moderate risk visit.      What are antibiotics?  Antibiotics are medicines that help people fight infections caused by bacteria. They work by killing bacteria that are in the body. These medicines come in many different forms, including pills, ointments, and liquids that are given by injection.    Antibiotics can do a lot of good. For people with serious bacterial infections, antibiotics can save lives. But people use them far too often, even when they're not needed. This is causing a very serious problem called antibiotic resistance. Antibiotic resistance happens when bacteria that have been exposed to an antibiotic change so that the antibiotic can no longer kill them. In those bacteria, the antibiotic has no effect. Because of this problem, doctors are having a harder and harder time treating infections. Experts worry that there will soon be infections that don't respond to any antibiotics.    When are antibiotics helpful?  Antibiotics can help people fight off infections caused by bacteria. They do not work on infections caused by viruses.    Some common bacterial infections that are treated with antibiotics include:    Strep throat    Pneumonia (an  infection of the lungs)    Bladder infections    Infections you catch through sex, such as gonorrhea and chlamydia    When are antibiotics NOT helpful?    Antibiotics do not work on infections caused by viruses.    Antibiotics are not helpful for the common cold, because the common cold is caused by a virus.    Antibiotics are not helpful for the flu, because the flu is caused by a virus. (People with the flu can be treated with medicines called antiviral medicines, which are different from antibiotics.)      Even though antibiotics don't work on infections caused by viruses, people sometimes believe that they do. That's because they took antibiotics for a viral infection before and then got better. The problem is that those people would have gotten better with or without an antibiotic. When they get better with the antibiotic, they think that's what cured them, when in reality the antibiotic had nothing to do with it.    What's the harm in taking antibiotics even if they might not help?  There are many reasons you should not take antibiotics unless you absolutely need them:    Antibiotics cause side effects, such as nausea, vomiting, and diarrhea. They can even make it more likely that you will get a different kind of infection, such as yeast infection (if you are a woman).    Allergies to antibiotics are common. You can develop an allergy to an antibiotic, even if you have not had a problem with it before. Some allergies are just unpleasant, causing rashes and itching. But some can be very serious and even life-threatening. It is better to avoid any risk of an allergy, if the antibiotic wouldn't help you anyway.    Overuse of antibiotics leads to antibiotic resistance. Using antibiotics when they are not needed gives bacteria a chance to change, so that the antibiotics cannot hurt them later on. People who have infections caused by antibiotic-resistant bacteria often have to be treated in the hospital with many  "different antibiotics. People can even die from these infections, because there is no antibiotic that will cure them.    When should I take antibiotics?  You should take antibiotics only when a doctor or nurse prescribes them to you. You should never take antibiotics prescribed to someone else, and you should not take antibiotics that were prescribed to you for a previous illness. When prescribing an antibiotic, doctors and nurses have to carefully pick the right antibiotic for a particular infection. Not all antibiotics work on all bacteria.    If you do have an infection caused by a bacteria, your doctor or nurse might want to find out what that bacteria is, and which antibiotics can kill it. They do this by taking a \"culture\" of the bacteria and growing it in the lab. But it's not possible to do a culture on someone who has already started an antibiotic. So if you start an antibiotic without input from a doctor or nurse it will be impossible to know if you have taken the right one.    What can I do to reduce antibiotic resistance?  Here are some things that can help:    Do not pressure your doctor or nurse for antibiotics when they do not think you need them.    If you are prescribed antibiotics, finish all of the medicine and take it exactly as directed. Never skip doses or stop taking the medicine without talking to your doctor or nurse.    Do not give antibiotics that were prescribed to you to anyone else.    What if my doctor prescribes an antibiotic that did not work for me before?  If an antibiotic did not work for you before, that does not mean it will never work for you. If you have used an antibiotic before and it did not work, tell your doctor. But keep in mind that the infection you had before might not have been caused by the same bacteria that you have now. The \"best\" antibiotic is the right one for the bacteria causing the infection, not for the person with the infection.    What if I am allergic to " an antibiotic?  If you had a bad reaction to an antibiotic, tell your doctor or nurse about it. But do not assume you are allergic unless your doctor or nurse tells you that you are.    Many people who think they are allergic to an antibiotic are wrong. If you get nauseous after taking an antibiotic, that does not mean you are allergic to it. Nausea is a common side effect of many antibiotics. If you are a woman and you get a yeast infection after taking an antibiotic, that does not mean you are allergic to it. Yeast infections are a common side effect of antibiotics.    Symptoms of antibiotic allergy can be mild and include a flat rash and itching. They can also be more serious and include:    Hives - Hives are raised, red patches of skin that are usually very itchy (picture 1).    Lip or tongue swelling    Trouble swallowing or breathing    Serious allergy symptoms can start right after you start taking an antibiotic if you are very sensitive. Less serious symptoms, on the other hand, often start a day or more later.    Almost all antibiotics may cause possible side effects of allergic reaction, nausea, vomiting, diarrhea- most worrisome caused by C. diff, and secondary yeast infection.        Assessment/Plan        1. Cough, unspecified type  azithromycin (Zithromax Z-Dain) 250 mg tablet                Start zithromax              Call if no better or if symptoms worsen

## 2024-01-08 ENCOUNTER — OFFICE VISIT (OUTPATIENT)
Dept: PRIMARY CARE | Facility: CLINIC | Age: 69
End: 2024-01-08
Payer: MEDICARE

## 2024-01-08 VITALS
TEMPERATURE: 98.3 F | HEART RATE: 61 BPM | BODY MASS INDEX: 31.39 KG/M2 | WEIGHT: 218.8 LBS | DIASTOLIC BLOOD PRESSURE: 63 MMHG | SYSTOLIC BLOOD PRESSURE: 118 MMHG | OXYGEN SATURATION: 96 %

## 2024-01-08 DIAGNOSIS — R05.9 COUGH, UNSPECIFIED TYPE: Primary | ICD-10-CM

## 2024-01-08 PROCEDURE — 1125F AMNT PAIN NOTED PAIN PRSNT: CPT | Performed by: FAMILY MEDICINE

## 2024-01-08 PROCEDURE — 99214 OFFICE O/P EST MOD 30 MIN: CPT | Performed by: FAMILY MEDICINE

## 2024-01-08 PROCEDURE — 3008F BODY MASS INDEX DOCD: CPT | Performed by: FAMILY MEDICINE

## 2024-01-08 PROCEDURE — 1036F TOBACCO NON-USER: CPT | Performed by: FAMILY MEDICINE

## 2024-01-08 PROCEDURE — 1160F RVW MEDS BY RX/DR IN RCRD: CPT | Performed by: FAMILY MEDICINE

## 2024-01-08 PROCEDURE — 1159F MED LIST DOCD IN RCRD: CPT | Performed by: FAMILY MEDICINE

## 2024-01-08 RX ORDER — AZITHROMYCIN 250 MG/1
TABLET, FILM COATED ORAL
Qty: 6 TABLET | Refills: 0 | Status: SHIPPED | OUTPATIENT
Start: 2024-01-08 | End: 2024-02-22 | Stop reason: ALTCHOICE

## 2024-02-13 ENCOUNTER — TELEPHONE (OUTPATIENT)
Dept: HEMATOLOGY/ONCOLOGY | Facility: CLINIC | Age: 69
End: 2024-02-13
Payer: MEDICARE

## 2024-02-15 ENCOUNTER — LAB (OUTPATIENT)
Dept: LAB | Facility: CLINIC | Age: 69
End: 2024-02-15
Payer: MEDICARE

## 2024-02-15 DIAGNOSIS — E83.119 HEMOCHROMATOSIS, UNSPECIFIED HEMOCHROMATOSIS TYPE: ICD-10-CM

## 2024-02-15 LAB
AFP SERPL-MCNC: 4 NG/ML (ref 0–9)
ALBUMIN SERPL BCP-MCNC: 4.4 G/DL (ref 3.4–5)
ALP SERPL-CCNC: 77 U/L (ref 33–136)
ALT SERPL W P-5'-P-CCNC: 24 U/L (ref 10–52)
ANION GAP SERPL CALC-SCNC: 14 MMOL/L (ref 10–20)
AST SERPL W P-5'-P-CCNC: 21 U/L (ref 9–39)
BASOPHILS # BLD AUTO: 0.04 X10*3/UL (ref 0–0.1)
BASOPHILS NFR BLD AUTO: 0.6 %
BILIRUB SERPL-MCNC: 0.7 MG/DL (ref 0–1.2)
BUN SERPL-MCNC: 11 MG/DL (ref 6–23)
CALCIUM SERPL-MCNC: 9.8 MG/DL (ref 8.6–10.6)
CHLORIDE SERPL-SCNC: 105 MMOL/L (ref 98–107)
CO2 SERPL-SCNC: 26 MMOL/L (ref 21–32)
CREAT SERPL-MCNC: 0.8 MG/DL (ref 0.5–1.3)
EGFRCR SERPLBLD CKD-EPI 2021: >90 ML/MIN/1.73M*2
EOSINOPHIL # BLD AUTO: 0.18 X10*3/UL (ref 0–0.4)
EOSINOPHIL NFR BLD AUTO: 2.8 %
ERYTHROCYTE [DISTWIDTH] IN BLOOD BY AUTOMATED COUNT: 12.1 % (ref 11.5–14.5)
FERRITIN SERPL-MCNC: 51 NG/ML (ref 20–300)
GLUCOSE SERPL-MCNC: 96 MG/DL (ref 74–99)
HCT VFR BLD AUTO: 45.1 % (ref 41–52)
HGB BLD-MCNC: 15.4 G/DL (ref 13.5–17.5)
IMM GRANULOCYTES # BLD AUTO: 0.02 X10*3/UL (ref 0–0.5)
IMM GRANULOCYTES NFR BLD AUTO: 0.3 % (ref 0–0.9)
IRON SATN MFR SERPL: 53 % (ref 25–45)
IRON SERPL-MCNC: 155 UG/DL (ref 35–150)
LYMPHOCYTES # BLD AUTO: 2.25 X10*3/UL (ref 0.8–3)
LYMPHOCYTES NFR BLD AUTO: 34.8 %
MCH RBC QN AUTO: 31.8 PG (ref 26–34)
MCHC RBC AUTO-ENTMCNC: 34.1 G/DL (ref 32–36)
MCV RBC AUTO: 93 FL (ref 80–100)
MONOCYTES # BLD AUTO: 0.5 X10*3/UL (ref 0.05–0.8)
MONOCYTES NFR BLD AUTO: 7.7 %
NEUTROPHILS # BLD AUTO: 3.47 X10*3/UL (ref 1.6–5.5)
NEUTROPHILS NFR BLD AUTO: 53.8 %
NRBC BLD-RTO: NORMAL /100{WBCS}
PLATELET # BLD AUTO: 209 X10*3/UL (ref 150–450)
POTASSIUM SERPL-SCNC: 4.2 MMOL/L (ref 3.5–5.3)
PROT SERPL-MCNC: 6.9 G/DL (ref 6.4–8.2)
RBC # BLD AUTO: 4.84 X10*6/UL (ref 4.5–5.9)
SODIUM SERPL-SCNC: 141 MMOL/L (ref 136–145)
TIBC SERPL-MCNC: 295 UG/DL (ref 240–445)
UIBC SERPL-MCNC: 140 UG/DL (ref 110–370)
WBC # BLD AUTO: 6.5 X10*3/UL (ref 4.4–11.3)

## 2024-02-15 PROCEDURE — 85025 COMPLETE CBC W/AUTO DIFF WBC: CPT

## 2024-02-15 PROCEDURE — 80053 COMPREHEN METABOLIC PANEL: CPT | Performed by: INTERNAL MEDICINE

## 2024-02-15 PROCEDURE — 36415 COLL VENOUS BLD VENIPUNCTURE: CPT

## 2024-02-15 PROCEDURE — 82105 ALPHA-FETOPROTEIN SERUM: CPT | Performed by: INTERNAL MEDICINE

## 2024-02-15 PROCEDURE — 82728 ASSAY OF FERRITIN: CPT | Performed by: INTERNAL MEDICINE

## 2024-02-15 PROCEDURE — 83540 ASSAY OF IRON: CPT | Performed by: INTERNAL MEDICINE

## 2024-02-20 PROBLEM — J32.9 CHRONIC SINUSITIS: Status: ACTIVE | Noted: 2023-06-12

## 2024-02-20 RX ORDER — BROMPHENIRAMINE MALEATE, PSEUDOEPHEDRINE HYDROCHLORIDE, AND DEXTROMETHORPHAN HYDROBROMIDE 2; 30; 10 MG/5ML; MG/5ML; MG/5ML
SYRUP ORAL
COMMUNITY
Start: 2024-01-02 | End: 2024-02-22 | Stop reason: ALTCHOICE

## 2024-02-20 RX ORDER — BENZONATATE 200 MG/1
CAPSULE ORAL
COMMUNITY
Start: 2024-01-02 | End: 2024-02-22 | Stop reason: WASHOUT

## 2024-02-22 ENCOUNTER — DOCUMENTATION (OUTPATIENT)
Dept: HEMATOLOGY/ONCOLOGY | Facility: CLINIC | Age: 69
End: 2024-02-22

## 2024-02-22 ENCOUNTER — OFFICE VISIT (OUTPATIENT)
Dept: HEMATOLOGY/ONCOLOGY | Facility: CLINIC | Age: 69
End: 2024-02-22
Payer: MEDICARE

## 2024-02-22 VITALS
TEMPERATURE: 97.3 F | OXYGEN SATURATION: 95 % | SYSTOLIC BLOOD PRESSURE: 156 MMHG | DIASTOLIC BLOOD PRESSURE: 84 MMHG | BODY MASS INDEX: 31.25 KG/M2 | HEART RATE: 63 BPM | HEIGHT: 70 IN | WEIGHT: 218.26 LBS | RESPIRATION RATE: 18 BRPM

## 2024-02-22 DIAGNOSIS — E83.119 HEMOCHROMATOSIS, UNSPECIFIED HEMOCHROMATOSIS TYPE: ICD-10-CM

## 2024-02-22 PROCEDURE — 1159F MED LIST DOCD IN RCRD: CPT | Performed by: INTERNAL MEDICINE

## 2024-02-22 PROCEDURE — 1125F AMNT PAIN NOTED PAIN PRSNT: CPT | Performed by: INTERNAL MEDICINE

## 2024-02-22 PROCEDURE — 99214 OFFICE O/P EST MOD 30 MIN: CPT | Performed by: INTERNAL MEDICINE

## 2024-02-22 PROCEDURE — 1157F ADVNC CARE PLAN IN RCRD: CPT | Performed by: INTERNAL MEDICINE

## 2024-02-22 PROCEDURE — 1036F TOBACCO NON-USER: CPT | Performed by: INTERNAL MEDICINE

## 2024-02-22 PROCEDURE — 1160F RVW MEDS BY RX/DR IN RCRD: CPT | Performed by: INTERNAL MEDICINE

## 2024-02-22 PROCEDURE — 3008F BODY MASS INDEX DOCD: CPT | Performed by: INTERNAL MEDICINE

## 2024-02-22 RX ORDER — ACETAMINOPHEN 500 MG
500 TABLET ORAL DAILY
COMMUNITY

## 2024-02-22 ASSESSMENT — PAIN SCALES - GENERAL: PAINLEVEL: 2

## 2024-02-22 NOTE — PROGRESS NOTES
"Patient seen by Dr. Gutierrez today in clinic. Reinforcement education provided regarding next steps with plan of care.      PER DR. GUTIERREZ'S FUV NOTE TODAY: \"The patient had called for a follow up on February 22, 2024.. Reviewed lab data with the patient.  CBC on August 16, 2023 revealed hemoglobin 15.2 g/dL.   ferritin  in reference range, serum iron and saturation mildly elevated..  Patient can donate blood every 3 months  when advisable rather than proceed with therapeutic phlebotomies   The patient also describes pain radiating down both arms.  MRI of cervical spine in February 2024 revealed findings suggestive of cervical spondylosis/foraminal narrowing.  Suggest neurology opinion/physical therapy. \"    Pt continues with donation of blood and FUV in 6 mos.  Patient verbalizes understanding of plan of care via teachback method.             "

## 2024-02-22 NOTE — PROGRESS NOTES
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 He is seeing urologist for elevated PSA readings. He is interested in donating blood and has been contacted by [facility] since last evaluation the patient presents with pain radiating down both arms.  MRI of cervical spine revealed foraminal narrowing at C4-5, C5-6, C6-7 suggesting of cervical spondylosis.                 Past medical history:  1. migraines,  2. GERD,   3. Hyperlipidemia.     Past surgical history:  1. Arthroscopic left knee surgery for ligament tear,   2. Removal of skin cancer from left ear.  3. perforated diverticulum s/p surgery 4/2021.   4.  Sinus  surgery in May 2023                                                                    Review of Systems:   ·  System Review All other systems have been reviewed and are negative for complaint.      · Constitutional NEGATIVE: Fever, Chills, Anorexia, Weight Loss, Malaise      · Eyes NEGATIVE: Blurry Vision, Drainage, Diploplia, Redness, Vision Loss/ Change      · ENMT NEGATIVE: Nasal Discharge, Nasal Congestion, Ear Pain, Mouth Pain, Throat Pain      · Respiratory NEGATIVE: Dry Cough, Productive Cough, Hemoptysis, Wheezing, Shortness of Breath      · Cardiology NEGATIVE: Chest Pain, Dyspnea on Exertion, Orthopnea, Palpitations, Syncope      · Gastrointestinal NEGATIVE: Abdominal Pain, Constipation, Diarrhea, Nausea, Vomiting      · Genitourinary NEGATIVE: Discharge, Dysuria, Flank Pain, Frequency, Hematuria      · Musculoskeletal NEGATIVE: Decreased ROM, Pain, Swelling, Stiffness, Weakness      · Neurological NEGATIVE: Dizziness, Confusion, Headache, Seizures,  Syncope      · Psychiatric NEGATIVE: Mood Changes, Anxiety, Hallucinations, Sleep Changes, Suicidal Ideas      · Skin NEGATIVE: Mass, Pain, Pruritus, Rash, Ulcer      · Endocrine NEGATIVE: Heat Intolerance, Cold Intolerance, Sweat, Polyuria, Thirst      · Hematologic/Lymph NEGATIVE: Anemia, Bruising, Easy Bleeding, Night Sweats, Petechiae      · Allergic/Immunologic NEGATIVE: Anaphylaxis, Itchy/ Teary Eyes, Itching, Sneezing, Swelling      · Breast NEGATIVE: Pain, Mass, Discharge, Nipple Itching, Gynecomastia         Allergies and Intolerances:       Allergies:         No Known Allergies: Active     Outpatient Medication Profile:  * Patient Currently Takes Medications as of 24-Aug-2023 13:18 documented in Structured Notes         Afrin 0.05% nasal spray : Last Dose Taken:  , 2 spray(s) in each nostril every 4 hours, ONLY IF THERE IS SEVERE BLEEDING , Start Date: 15-May-2023         Ocean 0.65% nasal spray: Last Dose Taken:  , 4 spray(s) intranasally  every 4 hours , Start Date: 15-May-2023         Fish Oil oral capsule: Last Dose Taken:  , 1 cap(s) orally 2 times a day         tamsulosin 0.4 mg oral capsule: Last Dose Taken:  , 1 cap(s) orally once  a day         Vitamin B12: Last Dose Taken:           Vitamin D3 125 mcg (5000 intl units) oral capsule: Last Dose Taken:   , 1 cap(s) orally once a day         Zinc 140 mg (as elemental zinc 50 mg) oral tablet: Last Dose Taken:   , 1 tab(s) orally once a day         Vitamin C 1000 mg oral tablet: Last Dose Taken:  , 1 tab(s) orally once  a day         magnesium 250: Last Dose Taken:           Xhance 93 mcg/inh nasal spray: Last Dose Taken:  , 1 spray(s) nasal 2  times a day         Metamucil: Last Dose Taken:           azelastine 137 mcg/inh (0.1%) nasal spray: Last Dose Taken:  , 2 spray(s)  nasal 2 times a day, As Needed         Calcium 500+D: Last Dose Taken:           ibuprofen 600 mg oral tablet: Last Dose Taken:  , 1 tab(s) orally every  6 hours, As Needed          propranolol 60 mg oral tablet: Last Dose Taken:  , 1 tab(s) orally 2 times  a day         omeprazole 20 mg oral delayed release capsule: Last Dose Taken:  , 1 cap(s)  orally once a day         Maxalt 10 mg oral tablet: 1 tab(s) orally once a day, As Needed             Medical History:         Elevated PSA: ICD-10: R97.20, Status: Active         History of basal cell carcinoma (BCC): ICD-10: Z85.828, Status:  Active         Sensorineural hearing loss (SNHL): ICD-10: H90.5, Status:  Active         Hypertrophy of nasal turbinates: ICD-10: J34.3, Status: Active         Deviated nasal septum: ICD-10: J34.2, Status: Active         History of diverticulitis: ICD-10: Z87.19, Status: Active         IBS (irritable bowel syndrome): ICD-10: K58.9, Status: Active         ELISEO on CPAP: ICD-10: G47.33, Status: Active         HLD (hyperlipidemia): ICD-10: E78.5, Status: Active         GERD (gastroesophageal reflux disease): ICD-10: K21.9, Status:  Active         Migraines: ICD-10: G43.909, Status: Active         Hemochromatosis: ICD-10: E83.119, Status: Active         Iron overload: ICD-10: E83.19, Status: Active       Surg History:         History of Mohs micrographic surgery for skin cancer: ICD-10:  Z85.828, Status: Active         History of laparoscopy: ICD-10: Z98.890, Status: Active         History of lateral meniscus repair of left knee: ICD-10:  Z98.890, Status: Active         History of surgery: ICD-10: Z98.890, Status: Active, Description:  Repair of perforated diverticula.         History of colonoscopy: ICD-10: Z98.890, Status: Active        Social History:   Social Substance History:  ·  Social History denies smoking, alcohol and drug use   ·  Smoking Status never smoker   ·  Tobacco Use denies   ·  Alcohol Use denies   ·  Drug Use denies   ·  Additional History      The patient is 60 years old, , has 3 grown children. He works as a . He denies any history of smoking alcohol abuse drug abuse.     (1)            Vitals and Measurements:   Vitals: Temp: 36  HR: 57  RR: 18  BP: 123/71  SPO2%:   98   Measurements: HT(cm): 176.4  WT(kg): 99  BSA: 2.2   BMI:  31.8      Physical Exam:      Constitutional: Well developed, awake/alert/oriented  x3, no distress, alert and cooperative   Eyes: PERRL, EOMI, clear sclera   ENMT: mucous membranes moist, no apparent injury,  no lesions seen   Head/Neck: Neck supple, no apparent injury, thyroid  without mass or tenderness, No JVD, trachea midline, no bruits   Respiratory/Thorax: Patent airways, CTAB, normal  breath sounds with good chest expansion, thorax symmetric   Cardiovascular: Regular, rate and rhythm, no murmurs,  2+ equal pulses of the extremities, normal S 1and S 2   Gastrointestinal: Nondistended, soft, non-tender,  no rebound tenderness or guarding, no masses palpable, no organomegaly, +BS, no bruits   Genitourinary: No Discharge, vesicles or other abnormalities   Musculoskeletal: ROM intact, no joint swelling, normal  strength   Extremities: normal extremities, no cyanosis edema,  contusions or wounds, no clubbing   Neurological: alert and oriented x3, intact senses,  motor, response and reflexes, normal strength   Breast: No masses, tenderness, no discharge or discoloration   Lymphatic: No significant lymphadenopathy   Psychological: Appropriate mood and behavior   Skin: Warm and dry, no lesions, no rashes         Lab Results:           Assessment and Plan:      1. Heterozygosity for C282Y  The patient had called for a follow up on February 22, 2024.. Reviewed lab data with the patient.  CBC on August 16, 2023 revealed hemoglobin 15.2 g/dL.   ferritin  in reference range, serum iron and saturation mildly elevated..  Patient can donate blood every 3 months  when advisable rather than proceed with therapeutic phlebotomies   The patient also describes pain radiating down both arms.  MRI of cervical spine in February 2024 revealed findings suggestive of cervical  spondylosis/foraminal narrowing.  Suggest neurology opinion/physical therapy.     2. Hyperglycemia     3. Obesity  I have encouraged him to remain on his diet and exercise program to reduce his weight appropriate for his height and age.      4. The patient is to return in six months.

## 2024-06-25 PROBLEM — I10 PRIMARY HYPERTENSION: Chronic | Status: ACTIVE | Noted: 2024-06-25

## 2024-06-25 PROBLEM — Z13.89 SCREENING FOR MULTIPLE CONDITIONS: Chronic | Status: ACTIVE | Noted: 2024-06-25

## 2024-06-25 PROBLEM — E78.00 HYPERCHOLESTEROLEMIA: Chronic | Status: ACTIVE | Noted: 2024-06-25

## 2024-06-25 PROBLEM — Z12.11 SCREEN FOR COLON CANCER: Chronic | Status: ACTIVE | Noted: 2024-06-25

## 2024-06-25 NOTE — PROGRESS NOTES
Subjective        Enrique Stock is a 69 y.o. male here for follow up   Chief Complaint   Patient presents with    Follow-up     6 mo fuv  Pt would like to discuss vaccines and neurology referral    Medicare Annual Wellness Visit Subsequent       Chief Complaint   Patient presents with    Follow-up     6 mo fuv  Pt would like to discuss vaccines and neurology referral    Medicare Annual Wellness Visit Subsequent    Has been seeing Dr Lynch- closing [place] office   Would like closer Neuro to here     HPI     Pt is here for follow up office visit - Medicare Wellness/health maintenance were also discussed      Pt is doing well       May retire next year      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     Any recent notes that were available were reviewed.     All conditions are monitored, evaluated and assessed regularly.     Patient is compliant with current treatment regimen/medications.     Specialists involved include:              Dr Isaac - ENT     Dr Larry Guardado- manages- hemachromatosis- ferritin , low platelets- labs 2/2024 and PN      Dr Lefty Mann- 10/24/23- mass forearm- ultrasound was normal                  Due for lab       Patient Health Questionnaire-2 Score: 0 (07/01/24 0908)       A physical exam and/or Medicare Wellness was done at  today's office visit and if patient has requested other problems be addressed at today's office visit in addition to the physical portion of the visit:  the patient has been informed that they may be charged for both portions: discussing the medical problems they want addressed and the complete physical exam. Patient has signed consent and was given brochure.          Medicare Wellness Exam    The patent is being seen for subsequent  annual wellness  visit  Past Medical, Surgical and family History: Reviewed and updated in chart  Medications and Supplements: Review of all medications by a prescribing practitioner or clinical pharmacist (such as prescriptions, OTC, Herbal therapies and supplements) documented in the medical record.      Immunization History   Administered Date(s) Administered    Flu vaccine, quadrivalent, high-dose, preservative free, age 65y+ (FLUZONE) 10/15/2020, 10/09/2023    Hepatitis A vaccine, age 19 years and greater (HAVRIX) 04/24/2000    Hepatitis B vaccine, adult *Check Product/Dose* 04/24/2000, 05/30/2000    Influenza, High Dose Seasonal, Preservative Free 10/10/2021, 10/18/2022    Influenza, Unspecified 10/13/2014, 09/01/2019    Influenza, seasonal, injectable 10/26/2009, 10/12/2013, 10/15/2015, 10/04/2016, 10/15/2020    Pfizer COVID-19 vaccine, Fall 2023, 12 years and older, (30mcg/0.3mL) 11/02/2023    Pfizer Purple Cap SARS-CoV-2 03/08/2021, 03/29/2021, 10/10/2021, 10/18/2022    Pneumococcal conjugate vaccine, 13-valent (PREVNAR 13) 04/04/2019    Pneumococcal polysaccharide vaccine, 23-valent, age 2 years and older (PNEUMOVAX 23) 06/03/2020    Poliovirus vaccine, subcutaneous (IPOL) 05/17/2005    Tdap vaccine, age 7 year and older (BOOSTRIX, ADACEL) 01/01/2005, 06/24/2008, 02/19/2016    Typhoid, Unspecified 08/03/2011    Zoster, live 01/01/2013           Lab on 02/15/2024   Component Date Value Ref Range Status    WBC 02/15/2024 6.5  4.4 - 11.3 x10*3/uL Final    nRBC 02/15/2024    Final    Not Measured    RBC 02/15/2024 4.84  4.50 - 5.90 x10*6/uL Final    Hemoglobin 02/15/2024 15.4  13.5 - 17.5 g/dL Final    Hematocrit 02/15/2024 45.1  41.0 - 52.0 % Final    MCV 02/15/2024 93  80 - 100 fL Final    MCH 02/15/2024 31.8  26.0 - 34.0 pg Final    MCHC 02/15/2024 34.1  32.0 - 36.0 g/dL Final    RDW 02/15/2024 12.1  11.5 - 14.5 % Final    Platelets 02/15/2024 209  150 - 450 x10*3/uL Final    Neutrophils % 02/15/2024 53.8  40.0 - 80.0 %  Final    Immature Granulocytes %, Automated 02/15/2024 0.3  0.0 - 0.9 % Final    Immature Granulocyte Count (IG) includes promyelocytes, myelocytes and metamyelocytes but does not include bands. Percent differential counts (%) should be interpreted in the context of the absolute cell counts (cells/UL).    Lymphocytes % 02/15/2024 34.8  13.0 - 44.0 % Final    Monocytes % 02/15/2024 7.7  2.0 - 10.0 % Final    Eosinophils % 02/15/2024 2.8  0.0 - 6.0 % Final    Basophils % 02/15/2024 0.6  0.0 - 2.0 % Final    Neutrophils Absolute 02/15/2024 3.47  1.60 - 5.50 x10*3/uL Final    Percent differential counts (%) should be interpreted in the context of the absolute cell counts (cells/uL).    Immature Granulocytes Absolute, Au* 02/15/2024 0.02  0.00 - 0.50 x10*3/uL Final    Lymphocytes Absolute 02/15/2024 2.25  0.80 - 3.00 x10*3/uL Final    Monocytes Absolute 02/15/2024 0.50  0.05 - 0.80 x10*3/uL Final    Eosinophils Absolute 02/15/2024 0.18  0.00 - 0.40 x10*3/uL Final    Basophils Absolute 02/15/2024 0.04  0.00 - 0.10 x10*3/uL Final    Glucose 02/15/2024 96  74 - 99 mg/dL Final    Sodium 02/15/2024 141  136 - 145 mmol/L Final    Potassium 02/15/2024 4.2  3.5 - 5.3 mmol/L Final    Chloride 02/15/2024 105  98 - 107 mmol/L Final    Bicarbonate 02/15/2024 26  21 - 32 mmol/L Final    Anion Gap 02/15/2024 14  10 - 20 mmol/L Final    Urea Nitrogen 02/15/2024 11  6 - 23 mg/dL Final    Creatinine 02/15/2024 0.80  0.50 - 1.30 mg/dL Final    eGFR 02/15/2024 >90  >60 mL/min/1.73m*2 Final    Calculations of estimated GFR are performed using the 2021 CKD-EPI Study Refit equation without the race variable for the IDMS-Traceable creatinine methods.  https://jasn.asnjournals.org/content/early/2021/09/22/ASN.4126885807    Calcium 02/15/2024 9.8  8.6 - 10.6 mg/dL Final    Albumin 02/15/2024 4.4  3.4 - 5.0 g/dL Final    Alkaline Phosphatase 02/15/2024 77  33 - 136 U/L Final    Total Protein 02/15/2024 6.9  6.4 - 8.2 g/dL Final    AST 02/15/2024  21  9 - 39 U/L Final    Bilirubin, Total 02/15/2024 0.7  0.0 - 1.2 mg/dL Final    ALT 02/15/2024 24  10 - 52 U/L Final    Patients treated with Sulfasalazine may generate falsely decreased results for ALT.    Iron 02/15/2024 155 (H)  35 - 150 ug/dL Final    UIBC 02/15/2024 140  110 - 370 ug/dL Final    TIBC 02/15/2024 295  240 - 445 ug/dL Final    % Saturation 02/15/2024 53 (H)  25 - 45 % Final    Ferritin 02/15/2024 51  20 - 300 ng/mL Final    Alpha-Fetoprotein 02/15/2024 4  0 - 9 ng/mL Final   Lab on 12/18/2023   Component Date Value Ref Range Status    Albumin 12/18/2023 4.7  3.4 - 5.0 g/dL Final    Bilirubin, Total 12/18/2023 0.7  0.0 - 1.2 mg/dL Final    Bilirubin, Direct 12/18/2023 0.1  0.0 - 0.3 mg/dL Final    Alkaline Phosphatase 12/18/2023 76  33 - 136 U/L Final    ALT 12/18/2023 21  10 - 52 U/L Final    Patients treated with Sulfasalazine may generate falsely decreased results for ALT.    AST 12/18/2023 18  9 - 39 U/L Final    Total Protein 12/18/2023 7.3  6.4 - 8.2 g/dL Final    Cholesterol 12/18/2023 217 (H)  0 - 199 mg/dL Final          Age      Desirable   Borderline High   High     0-19 Y     0 - 169       170 - 199     >/= 200    20-24 Y     0 - 189       190 - 224     >/= 225         >24 Y     0 - 199       200 - 239     >/= 240   **All ranges are based on fasting samples. Specific   therapeutic targets will vary based on patient-specific   cardiac risk.    Pediatric guidelines reference:Pediatrics 2011, 128(S5).Adult guidelines reference: NCEP ATPIII Guidelines,J LUIS 2001, 258:2486-97    Venipuncture immediately after or during the administration of Metamizole may lead to falsely low results. Testing should be performed immediately prior to Metamizole dosing.    HDL-Cholesterol 12/18/2023 46.3  mg/dL Final      Age       Very Low   Low     Normal    High    0-19 Y    < 35      < 40     40-45     ----  20-24 Y    ----     < 40      >45      ----        >24 Y      ----     < 40     40-60      >60       Cholesterol/HDL Ratio 12/18/2023 4.7   Final      Ref Values  Desirable  < 3.4  High Risk  > 5.0    LDL Calculated 12/18/2023 130 (H)  <=99 mg/dL Final                                Near   Borderline      AGE      Desirable  Optimal    High     High     Very High     0-19 Y     0 - 109     ---    110-129   >/= 130     ----    20-24 Y     0 - 119     ---    120-159   >/= 160     ----      >24 Y     0 -  99   100-129  130-159   160-189     >/=190      VLDL 12/18/2023 40  0 - 40 mg/dL Final    Triglycerides 12/18/2023 202 (H)  0 - 149 mg/dL Final       Age         Desirable   Borderline High   High     Very High   0 D-90 D    19 - 174         ----         ----        ----  91 D- 9 Y     0 -  74        75 -  99     >/= 100      ----    10-19 Y     0 -  89        90 - 129     >/= 130      ----    20-24 Y     0 - 114       115 - 149     >/= 150      ----         >24 Y     0 - 149       150 - 199    200- 499    >/= 500    Venipuncture immediately after or during the administration of Metamizole may lead to falsely low results. Testing should be performed immediately prior to Metamizole dosing.    Non HDL Cholesterol 12/18/2023 171 (H)  0 - 149 mg/dL Final          Age       Desirable   Borderline High   High     Very High     0-19 Y     0 - 119       120 - 144     >/= 145    >/= 160    20-24 Y     0 - 149       150 - 189     >/= 190      ----         >24 Y    30 mg/dL above LDL Cholesterol goal      Hemoglobin A1C 12/18/2023 5.2  see below % Final    Estimated Average Glucose 12/18/2023 103  Not Established mg/dL Final    Vitamin B12 12/18/2023 614  211 - 911 pg/mL Final    Vitamin D, 25-Hydroxy, Total 12/18/2023 70  30 - 100 ng/mL Final   Lab on 10/09/2023   Component Date Value Ref Range Status    J LUIS 10/09/2023 Negative  Negative Final    The Antinuclear Antibody (J LUIS) test was performed using  indirect immunofluorescence assay with HEp-2 cells slide.    WBC 10/09/2023 7.0  4.4 - 11.3 x10*3/uL Final    nRBC 10/09/2023  0.0  0.0 - 0.0 /100 WBCs Final    RBC 10/09/2023 4.73  4.50 - 5.90 x10*6/uL Final    Hemoglobin 10/09/2023 14.8  13.5 - 17.5 g/dL Final    Hematocrit 10/09/2023 44.6  41.0 - 52.0 % Final    MCV 10/09/2023 94  80 - 100 fL Final    MCH 10/09/2023 31.3  26.0 - 34.0 pg Final    MCHC 10/09/2023 33.2  32.0 - 36.0 g/dL Final    RDW 10/09/2023 12.0  11.5 - 14.5 % Final    Platelets 10/09/2023 239  150 - 450 x10*3/uL Final    MPV 10/09/2023 10.0  7.5 - 11.5 fL Final    Citrulline Antibody, IgG 10/09/2023 <1  <3 U/mL Final    NEGATIVE < 3 U/ML  POSITIVE >=3 U/ML    C-Reactive Protein 10/09/2023 0.18  <1.00 mg/dL Final    Rheumatoid Factor 10/09/2023 <10  0 - 15 IU/mL Final    Sedimentation Rate 10/09/2023 8  0 - 20 mm/h Final    Creatine Kinase 10/09/2023 46  0 - 325 U/L Final   Orders Only on 08/10/2023   Component Date Value Ref Range Status    PSA 08/10/2023 3.90  0.00 - 4.00 ng/mL Final    Comment: The FDA requires that the method used for PSA assay be   reported to the physician. Values obtained with different   assay methods must not be used interchangeably. This test   was performed at [facility] using the Siemens  [employer] PSA method, which is a sandwich immunoassay using   chemiluminescence for quantitation. The assay is approved  for measurement of prostate-specific antigen (PSA) in   serum and may be used in conjunction with a digital rectal  examination in men 50 years and older as an aid in   detection of prostate cancer.   5-Alpha-reductase inhibitors (e.g. Proscar, Finasteride,   Avodart, Dutasteride and Betzaida) for the treatment of BPH   have been shown to lower PSA levels by an average of 50%   after 6 months of treatment.      Glucose 08/10/2023 87  74 - 99 mg/dL Final    Sodium 08/10/2023 141  136 - 145 mmol/L Final    Potassium 08/10/2023 4.0  3.5 - 5.3 mmol/L Final    Chloride 08/10/2023 107  98 - 107 mmol/L Final    Bicarbonate 08/10/2023 24  21 - 32 mmol/L Final    Anion Gap 08/10/2023  14  10 - 20 mmol/L Final    Urea Nitrogen 08/10/2023 13  6 - 23 mg/dL Final    Creatinine 08/10/2023 0.90  0.50 - 1.30 mg/dL Final    GFR MALE 08/10/2023 >90  >90 mL/min/1.73m2 Final    Comment:  CALCULATIONS OF ESTIMATED GFR ARE PERFORMED   USING THE 2021 CKD-EPI STUDY REFIT EQUATION   WITHOUT THE RACE VARIABLE FOR THE IDMS-TRACEABLE   CREATININE METHODS.    https://jasn.asnjournals.org/content/early/2021/09/22/ASN.4793775304      Calcium 08/10/2023 9.4  8.6 - 10.6 mg/dL Final    Albumin 08/10/2023 4.5  3.4 - 5.0 g/dL Final    Alkaline Phosphatase 08/10/2023 80  33 - 136 U/L Final    Total Protein 08/10/2023 7.2  6.4 - 8.2 g/dL Final    AST 08/10/2023 19  9 - 39 U/L Final    Total Bilirubin 08/10/2023 0.6  0.0 - 1.2 mg/dL Final    ALT (SGPT) 08/10/2023 23  10 - 52 U/L Final    Comment:  Patients treated with Sulfasalazine may generate    falsely decreased results for ALT.      Ferritin 08/10/2023 35  20 - 300 ug/L Final    WBC 08/10/2023 6.0  4.4 - 11.3 x10E9/L Final    nRBC 08/10/2023 0.0  0.0 - 0.0 /100 WBC Final    RBC 08/10/2023 4.76  4.50 - 5.90 x10E12/L Final    Hemoglobin 08/10/2023 15.3  13.5 - 17.5 g/dL Final    Hematocrit 08/10/2023 45.1  41.0 - 52.0 % Final    MCV 08/10/2023 95  80 - 100 fL Final    MCHC 08/10/2023 33.9  32.0 - 36.0 g/dL Final    Platelets 08/10/2023 216  150 - 450 x10E9/L Final    RDW 08/10/2023 11.9  11.5 - 14.5 % Final    Neutrophils % 08/10/2023 45.6  40.0 - 80.0 % Final    Immature Granulocytes %, Automated 08/10/2023 0.3  0.0 - 0.9 % Final    Comment:  Immature Granulocyte Count (IG) includes promyelocytes,    myelocytes and metamyelocytes but does not include bands.   Percent differential counts (%) should be interpreted in the   context of the absolute cell counts (cells/L).      Lymphocytes % 08/10/2023 41.1  13.0 - 44.0 % Final    Monocytes % 08/10/2023 9.4  2.0 - 10.0 % Final    Eosinophils % 08/10/2023 2.8  0.0 - 6.0 % Final    Basophils % 08/10/2023 0.8  0.0 - 2.0 % Final     "Neutrophils Absolute 08/10/2023 2.72  1.20 - 7.70 x10E9/L Final    Lymphocytes Absolute 08/10/2023 2.46  1.20 - 4.80 x10E9/L Final    Monocytes Absolute 08/10/2023 0.56  0.10 - 1.00 x10E9/L Final    Eosinophils Absolute 08/10/2023 0.17  0.00 - 0.70 x10E9/L Final    Basophils Absolute 08/10/2023 0.05  0.00 - 0.10 x10E9/L Final    Iron 08/10/2023 105  35 - 150 ug/dL Final    TIBC 08/10/2023 321  240 - 445 ug/dL Final    Iron Saturation 08/10/2023 33  25 - 45 % Final             Health Maintenance Topic       Topic Date    Medicare Annual Wellness Visit (AWV) today    PSA 8/10/2023     Health Maintenance Topics with due status: Not Due       Topic Last Completion Date    DTaP/Tdap/Td Vaccines 02/19/2016    Colorectal Cancer Screening-Dr Olea-colonoscopy 06/17/2021     Health Maintenance Topics with due status: Completed       Topic Last Completion Date    Hepatitis C Screening 04/02/2019    Pneumococcal Vaccine: 65+ Years 06/03/2020     Health Maintenance Topics with due status: Aged Out       Topic Date Due    HIB Vaccines Aged Out    Hepatitis A Vaccines Aged Out    Meningococcal Vaccine Aged Out    Rotavirus Vaccines Aged Out    HPV Vaccines Aged Out             Social History     Tobacco Use   Smoking Status Never    Passive exposure: Never   Smokeless Tobacco Never         Social History     Substance and Sexual Activity   Alcohol Use Never          Objective        Vitals:    07/01/24 0904   BP: 129/72   BP Location: Left arm   Patient Position: Sitting   BP Cuff Size: Large adult   Pulse: 54   Resp: 14   Temp: 36.5 °C (97.7 °F)   SpO2: 96%   Weight: 99.7 kg (219 lb 12.8 oz)   Height: 1.778 m (5' 10\")         Patient is alert and oriented x 3 , NAD  HEAD- normocephalic and atraumatic   EYES-conjunctiva-normal, lids - normal  EARS/NOSE- normal external exam   NECK-supple,FROM  CV- RRR without murmur  PULM- CTA bilaterally, normal respiratory effort  RESPIRATORY EFFORT- normal , no retractions or nasal flaring "   ABD- normoactive BS's   EXT- no edema,NT  SKIN- no abnormal skin lesions noted  NEURO- no focal deficits  PSYCH- pleasant, normal judgement and insight      BP Readings from Last 3 Encounters:   07/01/24 129/72   02/22/24 156/84   01/08/24 118/63           Wt Readings from Last 3 Encounters:   07/01/24 99.7 kg (219 lb 12.8 oz)   06/27/24 98.9 kg (218 lb)   02/22/24 99 kg (218 lb 4.1 oz)         BMI Readings from Last 3 Encounters:   07/01/24 31.54 kg/m²   06/27/24 31.28 kg/m²   02/22/24 31.74 kg/m²         The number and complexity of problems addressed is considered moderate.  The amount and/or complexity of data reviewed and analyzed is considered moderate. The risk of complications and/or morbidity/mortality of patient is considered moderate. Overall, this patient encounter is considered a moderate risk visit.    Patient has no concerns with pain today.      Patient is not on any high risk medications.      Patient is compliant with their medications.          Patient's BMI is elevated.  Plan- diet and exercise- BMI is elevated. Need to increase activity on a daily basis especially walking.  Monitor  total calories per day- decrease carbohydrates and fats. Goal - lose 1-2 pounds per week.    Recommend 150 minutes of moderate-intensity exercise as tolerated per week and 2-3 days of resistance, flexibility, and neuromotor exercises per week.    Normal BMI- 18.5-25    Overweight=  BMI 26-29    Obese= BMI 30-39    Morbidly Obese = BMI >40      Common Side Effects- Beta-blockers:          Dizziness  Feeling tired  Feeling lightheaded  Vision problems  Swelling of the hands, feet, ankles, or legs  Decreased sexual ability    Call your doctor if you have any of these signs:  Chest pain  Irregular heartbeat  Painful erection in men        Assessment/Plan            Problem List Items Addressed This Visit          Active Problems    Class 1 obesity due to excess calories with body mass index (BMI) of 31.0 to 31.9 in  adult (Chronic)    Hypercholesterolemia (Chronic)    Relevant Orders    Hepatic Function Panel    Lipid Panel    Follow Up In [facility] - PCP - Established    Medicare annual wellness visit, subsequent - Primary (Chronic)    Migraine    Relevant Orders    Referral to Neurology    Prediabetes    Relevant Orders    Hemoglobin A1C    Screening for multiple conditions (Chronic)    Vitamin B12 deficiency    Relevant Orders    Vitamin B12    Vitamin D deficiency    Relevant Orders    Vitamin D 25-Hydroxy,Total (for eval of Vitamin D levels)           Orders Placed This Encounter   Procedures    Vitamin D 25-Hydroxy,Total (for eval of Vitamin D levels)    Vitamin B12    Hemoglobin A1C    Hepatic Function Panel    Lipid Panel    Referral to Neurology         Continue medications      Ordered lab          Refer Neuro       Follow up in 6 months     Recommendations for men annual wellness exam:   Screening for Prostate Cancer- Men aged 55-69 years   colonoscopy at age 45, earlier if positive family history for breast or colon cancer   Screening for lung cancer with low-dose CT in all adults age 50-80 who have a 20 pack-year smoking history and currently smoke or have quit within the past 15 years  Screen AAA- Ultrasound one time only- if history smoking 100 cigarettes or more - men ages 65-75 years   Follow a healthy diet (Examples, Dash diet, Mediterranean diet)  Exercise 150 minutes per week   Maintain healthy weight (BMI < 25)  Do not smoke   Alcohol in moderation (up to 1 drink/day)  Get enough sleep (7-8 hours/night)  Make sure immunizations are up to date   Visit dentist twice yearly    If you are less than 60 years old, have diabetes mellitus, or chronic kidney disease, your goal blood pressure is < 140/90.  If you are older than 60 years old and do not have diabetes or kidney disease, your goal blood pressure is < 150/90.

## 2024-06-25 NOTE — PATIENT INSTRUCTIONS
PATIENT INSTRUCTIONS ARE COMPLETE and READY TO PRINT    Saline irrigations:  Continue doing saline irrigations once or twice daily. If you decide to do a saline irrigation, please irrigate first before using Flonase or Xhance. Otherwise, you will wash the nasal spray out of your nose with the irrigation.    Recipe to Make your Own Nasal Saline Solution:  Mix 8 ounces of distilled water or tap water (be sure to boil tap water, then let it cool down) with 1/2 teaspoon of baking soda and 1/2 teaspoon of table salt and shake bottle to dissolve.    Flonase:  Please continue using Flonase. Do 1 spray in each nostril once daily if this is working well to manage symptoms.    Sinus infection:  If you develop thick yellow green drainage, smell/taste loss, or facial pain/pressure, please start doing nasal saline irrigations 2-4 times daily and start Xhance for about 5-6 days. This can sometimes prevent a full-blown sinus infection from developing. If you continue to have excessive thick yellow green drainage, smell/taste loss, or facial pain/pressure for more than 5-6 days, please contact our office by calling [phone] for an antibiotic.    Follow up:  Please follow up with my nurse practitioner, Renata Sena in 6 months for long-term medical management of chronic sinusitis. Please feel free to contact her office at [phone] with any questions.    Elenie Attestation  By signing my name below, I, Reno Rod, attest that this documentation has been prepared under the direction and in the presence of Apolinar Isaac MD. All medical record entries made by the Scribe were at my direction or personally dictated by me. I have reviewed the chart and agree that the record accurately reflects my personal performance of the history, physical exam, discussion and plan.

## 2024-06-27 ENCOUNTER — APPOINTMENT (OUTPATIENT)
Dept: OTOLARYNGOLOGY | Facility: CLINIC | Age: 69
End: 2024-06-27
Payer: MEDICARE

## 2024-06-27 VITALS — WEIGHT: 218 LBS | HEIGHT: 70 IN | BODY MASS INDEX: 31.21 KG/M2

## 2024-06-27 DIAGNOSIS — J32.4 CHRONIC PANSINUSITIS: Primary | ICD-10-CM

## 2024-06-27 PROCEDURE — 99213 OFFICE O/P EST LOW 20 MIN: CPT | Performed by: OTOLARYNGOLOGY

## 2024-06-27 PROCEDURE — 31231 NASAL ENDOSCOPY DX: CPT | Performed by: OTOLARYNGOLOGY

## 2024-06-27 PROCEDURE — 1159F MED LIST DOCD IN RCRD: CPT | Performed by: OTOLARYNGOLOGY

## 2024-06-27 PROCEDURE — 1036F TOBACCO NON-USER: CPT | Performed by: OTOLARYNGOLOGY

## 2024-06-27 PROCEDURE — 1160F RVW MEDS BY RX/DR IN RCRD: CPT | Performed by: OTOLARYNGOLOGY

## 2024-06-27 PROCEDURE — 1157F ADVNC CARE PLAN IN RCRD: CPT | Performed by: OTOLARYNGOLOGY

## 2024-06-27 PROCEDURE — 3008F BODY MASS INDEX DOCD: CPT | Performed by: OTOLARYNGOLOGY

## 2024-06-27 RX ORDER — OXYBUTYNIN CHLORIDE 5 MG/1
5 TABLET, EXTENDED RELEASE ORAL DAILY
COMMUNITY
Start: 2024-05-06

## 2024-07-01 ENCOUNTER — LAB (OUTPATIENT)
Dept: LAB | Facility: LAB | Age: 69
End: 2024-07-01
Payer: MEDICARE

## 2024-07-01 ENCOUNTER — APPOINTMENT (OUTPATIENT)
Dept: PRIMARY CARE | Facility: CLINIC | Age: 69
End: 2024-07-01
Payer: MEDICARE

## 2024-07-01 VITALS
DIASTOLIC BLOOD PRESSURE: 72 MMHG | SYSTOLIC BLOOD PRESSURE: 129 MMHG | TEMPERATURE: 97.7 F | HEIGHT: 70 IN | HEART RATE: 54 BPM | WEIGHT: 219.8 LBS | RESPIRATION RATE: 14 BRPM | OXYGEN SATURATION: 96 % | BODY MASS INDEX: 31.47 KG/M2

## 2024-07-01 DIAGNOSIS — E55.9 VITAMIN D DEFICIENCY: Chronic | ICD-10-CM

## 2024-07-01 DIAGNOSIS — E53.8 VITAMIN B12 DEFICIENCY: Chronic | ICD-10-CM

## 2024-07-01 DIAGNOSIS — E78.00 HYPERCHOLESTEROLEMIA: Chronic | ICD-10-CM

## 2024-07-01 DIAGNOSIS — R73.03 PREDIABETES: Chronic | ICD-10-CM

## 2024-07-01 DIAGNOSIS — Z00.00 MEDICARE ANNUAL WELLNESS VISIT, SUBSEQUENT: Primary | Chronic | ICD-10-CM

## 2024-07-01 DIAGNOSIS — Z13.89 SCREENING FOR MULTIPLE CONDITIONS: Chronic | ICD-10-CM

## 2024-07-01 DIAGNOSIS — E66.09 CLASS 1 OBESITY DUE TO EXCESS CALORIES WITH BODY MASS INDEX (BMI) OF 31.0 TO 31.9 IN ADULT, UNSPECIFIED WHETHER SERIOUS COMORBIDITY PRESENT: Chronic | ICD-10-CM

## 2024-07-01 DIAGNOSIS — G43.809 OTHER MIGRAINE WITHOUT STATUS MIGRAINOSUS, NOT INTRACTABLE: ICD-10-CM

## 2024-07-01 PROBLEM — E66.811 CLASS 1 OBESITY DUE TO EXCESS CALORIES WITH BODY MASS INDEX (BMI) OF 31.0 TO 31.9 IN ADULT: Chronic | Status: ACTIVE | Noted: 2024-07-01

## 2024-07-01 LAB
EST. AVERAGE GLUCOSE BLD GHB EST-MCNC: 103 MG/DL
HBA1C MFR BLD: 5.2 %

## 2024-07-01 PROCEDURE — 3008F BODY MASS INDEX DOCD: CPT | Performed by: FAMILY MEDICINE

## 2024-07-01 PROCEDURE — 1160F RVW MEDS BY RX/DR IN RCRD: CPT | Performed by: FAMILY MEDICINE

## 2024-07-01 PROCEDURE — 82607 VITAMIN B-12: CPT

## 2024-07-01 PROCEDURE — 1159F MED LIST DOCD IN RCRD: CPT | Performed by: FAMILY MEDICINE

## 2024-07-01 PROCEDURE — 80061 LIPID PANEL: CPT

## 2024-07-01 PROCEDURE — 1123F ACP DISCUSS/DSCN MKR DOCD: CPT | Performed by: FAMILY MEDICINE

## 2024-07-01 PROCEDURE — 82306 VITAMIN D 25 HYDROXY: CPT

## 2024-07-01 PROCEDURE — 1036F TOBACCO NON-USER: CPT | Performed by: FAMILY MEDICINE

## 2024-07-01 PROCEDURE — 80076 HEPATIC FUNCTION PANEL: CPT

## 2024-07-01 PROCEDURE — 1157F ADVNC CARE PLAN IN RCRD: CPT | Performed by: FAMILY MEDICINE

## 2024-07-01 PROCEDURE — 36415 COLL VENOUS BLD VENIPUNCTURE: CPT

## 2024-07-01 PROCEDURE — 83036 HEMOGLOBIN GLYCOSYLATED A1C: CPT

## 2024-07-01 PROCEDURE — 1170F FXNL STATUS ASSESSED: CPT | Performed by: FAMILY MEDICINE

## 2024-07-01 PROCEDURE — G0439 PPPS, SUBSEQ VISIT: HCPCS | Performed by: FAMILY MEDICINE

## 2024-07-01 PROCEDURE — 3074F SYST BP LT 130 MM HG: CPT | Performed by: FAMILY MEDICINE

## 2024-07-01 PROCEDURE — 3078F DIAST BP <80 MM HG: CPT | Performed by: FAMILY MEDICINE

## 2024-07-01 PROCEDURE — 99214 OFFICE O/P EST MOD 30 MIN: CPT | Performed by: FAMILY MEDICINE

## 2024-07-01 ASSESSMENT — ENCOUNTER SYMPTOMS
DEPRESSION: 0
OCCASIONAL FEELINGS OF UNSTEADINESS: 1
LOSS OF SENSATION IN FEET: 0

## 2024-07-02 PROBLEM — F32.A DEPRESSIVE DISORDER: Status: ACTIVE | Noted: 2024-07-02

## 2024-07-02 LAB
25(OH)D3 SERPL-MCNC: 83 NG/ML (ref 30–100)
ALBUMIN SERPL BCP-MCNC: 4.4 G/DL (ref 3.4–5)
ALP SERPL-CCNC: 76 U/L (ref 33–136)
ALT SERPL W P-5'-P-CCNC: 21 U/L (ref 10–52)
AST SERPL W P-5'-P-CCNC: 18 U/L (ref 9–39)
BILIRUB DIRECT SERPL-MCNC: 0.1 MG/DL (ref 0–0.3)
BILIRUB SERPL-MCNC: 0.6 MG/DL (ref 0–1.2)
CHOLEST SERPL-MCNC: 192 MG/DL (ref 0–199)
CHOLESTEROL/HDL RATIO: 4.4
HDLC SERPL-MCNC: 43.4 MG/DL
LDLC SERPL CALC-MCNC: 113 MG/DL
NON HDL CHOLESTEROL: 149 MG/DL (ref 0–149)
PROT SERPL-MCNC: 7.1 G/DL (ref 6.4–8.2)
TRIGL SERPL-MCNC: 176 MG/DL (ref 0–149)
VIT B12 SERPL-MCNC: 485 PG/ML (ref 211–911)
VLDL: 35 MG/DL (ref 0–40)

## 2024-08-16 ENCOUNTER — LAB (OUTPATIENT)
Dept: LAB | Facility: CLINIC | Age: 69
End: 2024-08-16
Payer: MEDICARE

## 2024-08-16 DIAGNOSIS — K21.9 GASTRO-ESOPHAGEAL REFLUX DISEASE WITHOUT ESOPHAGITIS: Primary | ICD-10-CM

## 2024-08-16 DIAGNOSIS — E83.119 HEMOCHROMATOSIS, UNSPECIFIED HEMOCHROMATOSIS TYPE: ICD-10-CM

## 2024-08-16 LAB
BASOPHILS # BLD AUTO: 0.04 X10*3/UL (ref 0–0.1)
BASOPHILS NFR BLD AUTO: 0.6 %
EOSINOPHIL # BLD AUTO: 0.19 X10*3/UL (ref 0–0.4)
EOSINOPHIL NFR BLD AUTO: 2.9 %
ERYTHROCYTE [DISTWIDTH] IN BLOOD BY AUTOMATED COUNT: 12.7 % (ref 11.5–14.5)
HCT VFR BLD AUTO: 41.9 % (ref 41–52)
HGB BLD-MCNC: 14.3 G/DL (ref 13.5–17.5)
IMM GRANULOCYTES # BLD AUTO: 0.03 X10*3/UL (ref 0–0.5)
IMM GRANULOCYTES NFR BLD AUTO: 0.5 % (ref 0–0.9)
LYMPHOCYTES # BLD AUTO: 2.41 X10*3/UL (ref 0.8–3)
LYMPHOCYTES NFR BLD AUTO: 37 %
MCH RBC QN AUTO: 30.8 PG (ref 26–34)
MCHC RBC AUTO-ENTMCNC: 34.1 G/DL (ref 32–36)
MCV RBC AUTO: 90 FL (ref 80–100)
MONOCYTES # BLD AUTO: 0.55 X10*3/UL (ref 0.05–0.8)
MONOCYTES NFR BLD AUTO: 8.4 %
NEUTROPHILS # BLD AUTO: 3.3 X10*3/UL (ref 1.6–5.5)
NEUTROPHILS NFR BLD AUTO: 50.6 %
NRBC BLD-RTO: NORMAL /100{WBCS}
PLATELET # BLD AUTO: 222 X10*3/UL (ref 150–450)
RBC # BLD AUTO: 4.65 X10*6/UL (ref 4.5–5.9)
WBC # BLD AUTO: 6.5 X10*3/UL (ref 4.4–11.3)

## 2024-08-16 PROCEDURE — 82607 VITAMIN B-12: CPT | Performed by: INTERNAL MEDICINE

## 2024-08-16 PROCEDURE — 82746 ASSAY OF FOLIC ACID SERUM: CPT | Performed by: INTERNAL MEDICINE

## 2024-08-16 PROCEDURE — 82105 ALPHA-FETOPROTEIN SERUM: CPT | Performed by: INTERNAL MEDICINE

## 2024-08-16 PROCEDURE — 85025 COMPLETE CBC W/AUTO DIFF WBC: CPT

## 2024-08-16 PROCEDURE — 82728 ASSAY OF FERRITIN: CPT | Performed by: INTERNAL MEDICINE

## 2024-08-16 PROCEDURE — 80053 COMPREHEN METABOLIC PANEL: CPT | Performed by: INTERNAL MEDICINE

## 2024-08-16 PROCEDURE — 83540 ASSAY OF IRON: CPT | Performed by: INTERNAL MEDICINE

## 2024-08-16 PROCEDURE — 36415 COLL VENOUS BLD VENIPUNCTURE: CPT

## 2024-08-17 LAB
AFP SERPL-MCNC: <4 NG/ML (ref 0–9)
ALBUMIN SERPL BCP-MCNC: 4.5 G/DL (ref 3.4–5)
ALP SERPL-CCNC: 74 U/L (ref 33–136)
ALT SERPL W P-5'-P-CCNC: 21 U/L (ref 10–52)
ANION GAP SERPL CALC-SCNC: 15 MMOL/L (ref 10–20)
AST SERPL W P-5'-P-CCNC: 18 U/L (ref 9–39)
BILIRUB SERPL-MCNC: 0.6 MG/DL (ref 0–1.2)
BUN SERPL-MCNC: 11 MG/DL (ref 6–23)
CALCIUM SERPL-MCNC: 9.3 MG/DL (ref 8.6–10.6)
CHLORIDE SERPL-SCNC: 104 MMOL/L (ref 98–107)
CO2 SERPL-SCNC: 24 MMOL/L (ref 21–32)
CREAT SERPL-MCNC: 0.85 MG/DL (ref 0.5–1.3)
EGFRCR SERPLBLD CKD-EPI 2021: >90 ML/MIN/1.73M*2
FERRITIN SERPL-MCNC: 27 NG/ML (ref 20–300)
FOLATE SERPL-MCNC: 14.6 NG/ML
GLUCOSE SERPL-MCNC: 82 MG/DL (ref 74–99)
IRON SATN MFR SERPL: 24 % (ref 25–45)
IRON SERPL-MCNC: 80 UG/DL (ref 35–150)
POTASSIUM SERPL-SCNC: 4 MMOL/L (ref 3.5–5.3)
PROT SERPL-MCNC: 7.1 G/DL (ref 6.4–8.2)
SODIUM SERPL-SCNC: 139 MMOL/L (ref 136–145)
TIBC SERPL-MCNC: 335 UG/DL (ref 240–445)
UIBC SERPL-MCNC: 255 UG/DL (ref 110–370)
VIT B12 SERPL-MCNC: 659 PG/ML (ref 211–911)

## 2024-08-22 ENCOUNTER — OFFICE VISIT (OUTPATIENT)
Dept: HEMATOLOGY/ONCOLOGY | Facility: CLINIC | Age: 69
End: 2024-08-22
Payer: MEDICARE

## 2024-08-22 ENCOUNTER — EDUCATION (OUTPATIENT)
Dept: HEMATOLOGY/ONCOLOGY | Facility: CLINIC | Age: 69
End: 2024-08-22

## 2024-08-22 VITALS
TEMPERATURE: 98.1 F | OXYGEN SATURATION: 95 % | HEART RATE: 64 BPM | SYSTOLIC BLOOD PRESSURE: 145 MMHG | RESPIRATION RATE: 16 BRPM | DIASTOLIC BLOOD PRESSURE: 79 MMHG | WEIGHT: 217.81 LBS | BODY MASS INDEX: 31.25 KG/M2

## 2024-08-22 DIAGNOSIS — E83.119 HEMOCHROMATOSIS, UNSPECIFIED HEMOCHROMATOSIS TYPE: ICD-10-CM

## 2024-08-22 PROCEDURE — 99213 OFFICE O/P EST LOW 20 MIN: CPT | Performed by: INTERNAL MEDICINE

## 2024-08-22 PROCEDURE — 3078F DIAST BP <80 MM HG: CPT | Performed by: INTERNAL MEDICINE

## 2024-08-22 PROCEDURE — 1157F ADVNC CARE PLAN IN RCRD: CPT | Performed by: INTERNAL MEDICINE

## 2024-08-22 PROCEDURE — 1159F MED LIST DOCD IN RCRD: CPT | Performed by: INTERNAL MEDICINE

## 2024-08-22 PROCEDURE — 1126F AMNT PAIN NOTED NONE PRSNT: CPT | Performed by: INTERNAL MEDICINE

## 2024-08-22 PROCEDURE — 1123F ACP DISCUSS/DSCN MKR DOCD: CPT | Performed by: INTERNAL MEDICINE

## 2024-08-22 PROCEDURE — 3077F SYST BP >= 140 MM HG: CPT | Performed by: INTERNAL MEDICINE

## 2024-08-22 ASSESSMENT — PAIN SCALES - GENERAL: PAINLEVEL: 0-NO PAIN

## 2024-08-22 NOTE — PROGRESS NOTES
"Patient seen by Dr. Gutierrez today in clinic. Reinforcement education provided regarding next steps with plan of care.      PER DR. 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 Reviewed lab data with the patient.  CBC on August 16, 2023 revealed hemoglobin 15.2 g/dL.   ferritin  in reference range, serum iron and saturation mildly elevated..  Patient can donate blood every 3 months  when advisable rather than proceed with therapeutic phlebotomies   The patient also describes pain radiating down both arms.  MRI of cervical spine in February 2024 revealed findings suggestive of cervical spondylosis/foraminal narrowing.  Suggest neurology opinion/physical therapy.\"    Reinforced FUV 6 mos with labs prior.  Pt reports he donated blood at [facility] 1 month ago. Patient verbalizes understanding of plan of care via teachback method.             "

## 2024-08-22 NOTE — PROGRESS NOTES
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 He is seeing urologist for elevated PSA readings. He is interested in donating blood and has been contacted by [employer] since last evaluation the patient presents with pain radiating down both arms.  MRI of cervical spine revealed foraminal narrowing at C4-5, C5-6, C6-7 suggesting of cervical spondylosis.                 Past medical history:  1. migraines,  2. GERD,   3. Hyperlipidemia.     Past surgical history:  1. Arthroscopic left knee surgery for ligament tear,   2. Removal of skin cancer from left ear.  3. perforated diverticulum s/p surgery 4/2021.   4.  Sinus  surgery in May 2023                                                                    Review of Systems:   ·  System Review All other systems have been reviewed and are negative for complaint.      · Constitutional NEGATIVE: Fever, Chills, Anorexia, Weight Loss, Malaise      · Eyes NEGATIVE: Blurry Vision, Drainage, Diploplia, Redness, Vision Loss/ Change      · ENMT NEGATIVE: Nasal Discharge, Nasal Congestion, Ear Pain, Mouth Pain, Throat Pain      · Respiratory NEGATIVE: Dry Cough, Productive Cough, Hemoptysis, Wheezing, Shortness of Breath      · Cardiology NEGATIVE: Chest Pain, Dyspnea on Exertion, Orthopnea, Palpitations, Syncope      · Gastrointestinal NEGATIVE: Abdominal Pain, Constipation, Diarrhea, Nausea, Vomiting      · Genitourinary NEGATIVE: Discharge, Dysuria, Flank Pain, Frequency, Hematuria      · Musculoskeletal NEGATIVE: Decreased ROM, Pain, Swelling, Stiffness, Weakness      · Neurological NEGATIVE: Dizziness, Confusion, Headache, Seizures, Syncope       · Psychiatric NEGATIVE: Mood Changes, Anxiety, Hallucinations, Sleep Changes, Suicidal Ideas      · Skin NEGATIVE: Mass, Pain, Pruritus, Rash, Ulcer      · Endocrine NEGATIVE: Heat Intolerance, Cold Intolerance, Sweat, Polyuria, Thirst      · Hematologic/Lymph NEGATIVE: Anemia, Bruising, Easy Bleeding, Night Sweats, Petechiae      · Allergic/Immunologic NEGATIVE: Anaphylaxis, Itchy/ Teary Eyes, Itching, Sneezing, Swelling      · Breast NEGATIVE: Pain, Mass, Discharge, Nipple Itching, Gynecomastia         Allergies and Intolerances:       Allergies:         No Known Allergies: Active     Outpatient Medication Profile:  * Patient Currently Takes Medications as of 24-Aug-2023 13:18 documented in Structured Notes         Afrin 0.05% nasal spray : Last Dose Taken:  , 2 spray(s) in each nostril every 4 hours, ONLY IF THERE IS SEVERE BLEEDING , Start Date: 15-May-2023         Ocean 0.65% nasal spray: Last Dose Taken:  , 4 spray(s) intranasally  every 4 hours , Start Date: 15-May-2023         Fish Oil oral capsule: Last Dose Taken:  , 1 cap(s) orally 2 times a day         tamsulosin 0.4 mg oral capsule: Last Dose Taken:  , 1 cap(s) orally once  a day         Vitamin B12: Last Dose Taken:           Vitamin D3 125 mcg (5000 intl units) oral capsule: Last Dose Taken:   , 1 cap(s) orally once a day         Zinc 140 mg (as elemental zinc 50 mg) oral tablet: Last Dose Taken:   , 1 tab(s) orally once a day         Vitamin C 1000 mg oral tablet: Last Dose Taken:  , 1 tab(s) orally once  a day         magnesium 250: Last Dose Taken:           Xhance 93 mcg/inh nasal spray: Last Dose Taken:  , 1 spray(s) nasal 2  times a day         Metamucil: Last Dose Taken:           azelastine 137 mcg/inh (0.1%) nasal spray: Last Dose Taken:  , 2 spray(s)  nasal 2 times a day, As Needed         Calcium 500+D: Last Dose Taken:           ibuprofen 600 mg oral tablet: Last Dose Taken:  , 1 tab(s) orally every  6 hours, As Needed          propranolol 60 mg oral tablet: Last Dose Taken:  , 1 tab(s) orally 2 times  a day         omeprazole 20 mg oral delayed release capsule: Last Dose Taken:  , 1 cap(s)  orally once a day         Maxalt 10 mg oral tablet: 1 tab(s) orally once a day, As Needed             Medical History:         Elevated PSA: ICD-10: R97.20, Status: Active         History of basal cell carcinoma (BCC): ICD-10: Z85.828, Status:  Active         Sensorineural hearing loss (SNHL): ICD-10: H90.5, Status:  Active         Hypertrophy of nasal turbinates: ICD-10: J34.3, Status: Active         Deviated nasal septum: ICD-10: J34.2, Status: Active         History of diverticulitis: ICD-10: Z87.19, Status: Active         IBS (irritable bowel syndrome): ICD-10: K58.9, Status: Active         ELISEO on CPAP: ICD-10: G47.33, Status: Active         HLD (hyperlipidemia): ICD-10: E78.5, Status: Active         GERD (gastroesophageal reflux disease): ICD-10: K21.9, Status:  Active         Migraines: ICD-10: G43.909, Status: Active         Hemochromatosis: ICD-10: E83.119, Status: Active         Iron overload: ICD-10: E83.19, Status: Active       Surg History:         History of Mohs micrographic surgery for skin cancer: ICD-10:  Z85.828, Status: Active         History of laparoscopy: ICD-10: Z98.890, Status: Active         History of lateral meniscus repair of left knee: ICD-10:  Z98.890, Status: Active         History of surgery: ICD-10: Z98.890, Status: Active, Description:  Repair of perforated diverticula.         History of colonoscopy: ICD-10: Z98.890, Status: Active        Social History:   Social Substance History:  ·  Social History denies smoking, alcohol and drug use   ·  Smoking Status never smoker   ·  Tobacco Use denies   ·  Alcohol Use denies   ·  Drug Use denies   ·  Additional History      The patient is 60 years old, , has 3 grown children. He works as a . He denies any history of smoking alcohol abuse drug abuse.     (1)            Vitals and Measurements:   Vitals: Temp: 36  HR: 57  RR: 18  BP: 123/71  SPO2%:   98   Measurements: HT(cm): 176.4  WT(kg): 99  BSA: 2.2   BMI:  31.8      Physical Exam:      Constitutional: Well developed, awake/alert/oriented  x3, no distress, alert and cooperative   Eyes: PERRL, EOMI, clear sclera   ENMT: mucous membranes moist, no apparent injury,  no lesions seen   Head/Neck: Neck supple, no apparent injury, thyroid  without mass or tenderness, No JVD, trachea midline, no bruits   Respiratory/Thorax: Patent airways, CTAB, normal  breath sounds with good chest expansion, thorax symmetric   Cardiovascular: Regular, rate and rhythm, no murmurs,  2+ equal pulses of the extremities, normal S 1and S 2   Gastrointestinal: Nondistended, soft, non-tender,  no rebound tenderness or guarding, no masses palpable, no organomegaly, +BS, no bruits   Genitourinary: No Discharge, vesicles or other abnormalities   Musculoskeletal: ROM intact, no joint swelling, normal  strength   Extremities: normal extremities, no cyanosis edema,  contusions or wounds, no clubbing   Neurological: alert and oriented x3, intact senses,  motor, response and reflexes, normal strength   Breast: No masses, tenderness, no discharge or discoloration   Lymphatic: No significant lymphadenopathy   Psychological: Appropriate mood and behavior   Skin: Warm and dry, no lesions, no rashes         Lab Results:           Assessment and Plan:      1. Heterozygosity for C282Y  The patient had called for a follow up on August 22, 2024.. Reviewed lab data with the patient.  CBC on August 16, 2023 revealed hemoglobin 15.2 g/dL.   ferritin  in reference range, serum iron and saturation mildly elevated..  Patient can donate blood every 3 months  when advisable rather than proceed with therapeutic phlebotomies   The patient also describes pain radiating down both arms.  MRI of cervical spine in February 2024 revealed findings suggestive of cervical  spondylosis/foraminal narrowing.  Suggest neurology opinion/physical therapy.     2. Hyperglycemia     3. Obesity  I have encouraged him to remain on his diet and exercise program to reduce his weight appropriate for his height and age.      4. The patient is to return in six months.

## 2024-09-21 DIAGNOSIS — G43.809 OTHER MIGRAINE WITHOUT STATUS MIGRAINOSUS, NOT INTRACTABLE: Primary | ICD-10-CM

## 2024-09-23 DIAGNOSIS — G43.809 OTHER MIGRAINE WITHOUT STATUS MIGRAINOSUS, NOT INTRACTABLE: ICD-10-CM

## 2024-09-23 RX ORDER — PROPRANOLOL HYDROCHLORIDE 60 MG/1
60 TABLET ORAL
Refills: 0 | OUTPATIENT
Start: 2024-09-23

## 2024-09-23 RX ORDER — PROPRANOLOL HYDROCHLORIDE 60 MG/1
60 TABLET ORAL EVERY 12 HOURS
Qty: 90 TABLET | Refills: 1 | Status: SHIPPED | OUTPATIENT
Start: 2024-09-23 | End: 2024-09-23 | Stop reason: SDUPTHER

## 2024-09-23 RX ORDER — PROPRANOLOL HYDROCHLORIDE 60 MG/1
60 TABLET ORAL EVERY 12 HOURS
Qty: 28 TABLET | Refills: 11 | Status: SHIPPED | OUTPATIENT
Start: 2024-09-23

## 2024-09-23 RX ORDER — PROPRANOLOL HYDROCHLORIDE 60 MG/1
60 TABLET ORAL DAILY
COMMUNITY
End: 2024-09-23 | Stop reason: SDUPTHER

## 2024-09-23 RX ORDER — PROPRANOLOL HYDROCHLORIDE 60 MG/1
60 TABLET ORAL DAILY
Qty: 90 TABLET | Refills: 1 | Status: SHIPPED | OUTPATIENT
Start: 2024-09-23

## 2024-09-23 NOTE — TELEPHONE ENCOUNTER
Patient called to clarify both propanolol prescriptions were sent over    Please resend correctly, [facility] script cancelled out [facility] script

## 2024-12-16 PROBLEM — G47.33 OSA (OBSTRUCTIVE SLEEP APNEA): Chronic | Status: ACTIVE | Noted: 2023-06-12

## 2024-12-16 PROBLEM — M79.603 PAIN OF UPPER EXTREMITY: Status: RESOLVED | Noted: 2023-10-09 | Resolved: 2024-12-16

## 2024-12-16 PROBLEM — E83.119 HEMOCHROMATOSIS, UNSPECIFIED: Chronic | Status: ACTIVE | Noted: 2023-06-12

## 2024-12-16 PROBLEM — E66.9 OBESITY DUE TO ENERGY IMBALANCE: Status: RESOLVED | Noted: 2023-12-28 | Resolved: 2024-12-16

## 2024-12-16 PROBLEM — R79.0 LOW FERRITIN LEVEL: Status: RESOLVED | Noted: 2023-12-28 | Resolved: 2024-12-16

## 2024-12-16 PROBLEM — F32.A DEPRESSIVE DISORDER: Chronic | Status: ACTIVE | Noted: 2024-07-02

## 2024-12-16 PROBLEM — R73.03 PREDIABETES: Chronic | Status: ACTIVE | Noted: 2023-06-12

## 2024-12-16 PROBLEM — Z86.59 HISTORY OF DEPRESSION: Status: RESOLVED | Noted: 2023-12-28 | Resolved: 2024-12-16

## 2024-12-16 PROBLEM — G43.909 MIGRAINE: Chronic | Status: ACTIVE | Noted: 2023-06-12

## 2024-12-16 PROBLEM — J34.89 NASAL CRUSTING: Status: RESOLVED | Noted: 2023-12-28 | Resolved: 2024-12-16

## 2024-12-16 PROBLEM — G54.9 NERVE ROOT DISORDER: Status: RESOLVED | Noted: 2023-12-28 | Resolved: 2024-12-16

## 2024-12-16 PROBLEM — M79.10 MUSCLE PAIN: Status: RESOLVED | Noted: 2023-10-09 | Resolved: 2024-12-16

## 2024-12-16 PROBLEM — R09.81 NASAL CONGESTION: Status: RESOLVED | Noted: 2023-12-28 | Resolved: 2024-12-16

## 2024-12-16 PROBLEM — R97.20 ELEVATED PSA: Chronic | Status: ACTIVE | Noted: 2023-06-12

## 2024-12-16 NOTE — PROGRESS NOTES
"Subjective        Mitchell Stock. Is 69 y.o.. male. Here for follow up office visit-   Would like to discuss knee joints- no pain- just \"pops\" unsteadiness, and being cold all the time    Discussed all with pt     Occ gait instability- per Neuro - plans exercises - no PT yet     Donates blood - no problems      Sees Dr Guardado for low iron and history anemia       No hair or GI changes     Discussed activity- C;[facility]     HPI:    What new Neuro did pt see?   Dr. Hanna 12/18/2024          Follow up for depression, vit d vitb12, migraines, prediabetes , chol , elevated BMI, vit D     No Medication for depression    Pt is doing well    Other medical specialists are involved in patient's care.     Any recent notes that were available were reviewed.     All conditions are monitored, evaluated and assessed regularly.     Patient is compliant with current treatment regimen/medications.     Specialists involved include:              Dr Isaac - ENT     Dr Juarez- PSA      Dr Guardado- manages- hemachromatosis- ferritin , low platelets- labs 2/2024 and PN      Dr Lefty Mann- 10/24/23- mass forearm- ultrasound was normal     Dr Hanna - neuro                 Due for lab     Labs reported in this progress note have been reviewed at or prior to this office visit.      Lab on 08/16/2024   Component Date Value Ref Range Status    WBC 08/16/2024 6.5  4.4 - 11.3 x10*3/uL Final    nRBC 08/16/2024    Final    Not Measured    RBC 08/16/2024 4.65  4.50 - 5.90 x10*6/uL Final    Hemoglobin 08/16/2024 14.3  13.5 - 17.5 g/dL Final    Hematocrit 08/16/2024 41.9  41.0 - 52.0 % Final    MCV 08/16/2024 90  80 - 100 fL Final    MCH 08/16/2024 30.8  26.0 - 34.0 pg Final    MCHC 08/16/2024 34.1  32.0 - 36.0 g/dL Final    RDW 08/16/2024 12.7  11.5 - 14.5 % Final    Platelets 08/16/2024 222  150 - 450 x10*3/uL Final    Neutrophils % 08/16/2024 50.6  40.0 - 80.0 % Final    Immature Granulocytes %, " Automated 08/16/2024 0.5  0.0 - 0.9 % Final    Immature Granulocyte Count (IG) includes promyelocytes, myelocytes and metamyelocytes but does not include bands. Percent differential counts (%) should be interpreted in the context of the absolute cell counts (cells/UL).    Lymphocytes % 08/16/2024 37.0  13.0 - 44.0 % Final    Monocytes % 08/16/2024 8.4  2.0 - 10.0 % Final    Eosinophils % 08/16/2024 2.9  0.0 - 6.0 % Final    Basophils % 08/16/2024 0.6  0.0 - 2.0 % Final    Neutrophils Absolute 08/16/2024 3.30  1.60 - 5.50 x10*3/uL Final    Percent differential counts (%) should be interpreted in the context of the absolute cell counts (cells/uL).    Immature Granulocytes Absolute, Au* 08/16/2024 0.03  0.00 - 0.50 x10*3/uL Final    Lymphocytes Absolute 08/16/2024 2.41  0.80 - 3.00 x10*3/uL Final    Monocytes Absolute 08/16/2024 0.55  0.05 - 0.80 x10*3/uL Final    Eosinophils Absolute 08/16/2024 0.19  0.00 - 0.40 x10*3/uL Final    Basophils Absolute 08/16/2024 0.04  0.00 - 0.10 x10*3/uL Final    Glucose 08/16/2024 82  74 - 99 mg/dL Final    Sodium 08/16/2024 139  136 - 145 mmol/L Final    Potassium 08/16/2024 4.0  3.5 - 5.3 mmol/L Final    Chloride 08/16/2024 104  98 - 107 mmol/L Final    Bicarbonate 08/16/2024 24  21 - 32 mmol/L Final    Anion Gap 08/16/2024 15  10 - 20 mmol/L Final    Urea Nitrogen 08/16/2024 11  6 - 23 mg/dL Final    Creatinine 08/16/2024 0.85  0.50 - 1.30 mg/dL Final    eGFR 08/16/2024 >90  >60 mL/min/1.73m*2 Final    Calculations of estimated GFR are performed using the 2021 CKD-EPI Study Refit equation without the race variable for the IDMS-Traceable creatinine methods.  https://jasn.asnjournals.org/content/early/2021/09/22/ASN.5810003284    Calcium 08/16/2024 9.3  8.6 - 10.6 mg/dL Final    Albumin 08/16/2024 4.5  3.4 - 5.0 g/dL Final    Alkaline Phosphatase 08/16/2024 74  33 - 136 U/L Final    Total Protein 08/16/2024 7.1  6.4 - 8.2 g/dL Final    AST 08/16/2024 18  9 - 39 U/L Final    Bilirubin,  Total 08/16/2024 0.6  0.0 - 1.2 mg/dL Final    ALT 08/16/2024 21  10 - 52 U/L Final    Patients treated with Sulfasalazine may generate falsely decreased results for ALT.    Ferritin 08/16/2024 27  20 - 300 ng/mL Final    Iron 08/16/2024 80  35 - 150 ug/dL Final    UIBC 08/16/2024 255  110 - 370 ug/dL Final    TIBC 08/16/2024 335  240 - 445 ug/dL Final    % Saturation 08/16/2024 24 (L)  25 - 45 % Final    Alpha-Fetoprotein 08/16/2024 <4  0 - 9 ng/mL Final    Folate, Serum 08/16/2024 14.6  >5.0 ng/mL Final    Vitamin B12 08/16/2024 659  211 - 911 pg/mL Final   Lab on 07/01/2024   Component Date Value Ref Range Status    Vitamin D, 25-Hydroxy, Total 07/01/2024 83  30 - 100 ng/mL Final    Vitamin B12 07/01/2024 485  211 - 911 pg/mL Final    Hemoglobin A1C 07/01/2024 5.2  see below % Final    Estimated Average Glucose 07/01/2024 103  Not Established mg/dL Final    Albumin 07/01/2024 4.4  3.4 - 5.0 g/dL Final    Bilirubin, Total 07/01/2024 0.6  0.0 - 1.2 mg/dL Final    Bilirubin, Direct 07/01/2024 0.1  0.0 - 0.3 mg/dL Final    Alkaline Phosphatase 07/01/2024 76  33 - 136 U/L Final    ALT 07/01/2024 21  10 - 52 U/L Final    Patients treated with Sulfasalazine may generate falsely decreased results for ALT.    AST 07/01/2024 18  9 - 39 U/L Final    Total Protein 07/01/2024 7.1  6.4 - 8.2 g/dL Final    Cholesterol 07/01/2024 192  0 - 199 mg/dL Final          Age      Desirable   Borderline High   High     0-19 Y     0 - 169       170 - 199     >/= 200    20-24 Y     0 - 189       190 - 224     >/= 225         >24 Y     0 - 199       200 - 239     >/= 240   **All ranges are based on fasting samples. Specific   therapeutic targets will vary based on patient-specific   cardiac risk.    Pediatric guidelines reference:Pediatrics 2011, 128(S5).Adult guidelines reference: NCEP ATPIII Guidelines,J LUIS 2001, 258:2486-97    Venipuncture immediately after or during the administration of Metamizole may lead to falsely low results.  Testing should be performed immediately prior to Metamizole dosing.    HDL-Cholesterol 07/01/2024 43.4  mg/dL Final      Age       Very Low   Low     Normal    High    0-19 Y    < 35      < 40     40-45     ----  20-24 Y    ----     < 40      >45      ----        >24 Y      ----     < 40     40-60      >60      Cholesterol/HDL Ratio 07/01/2024 4.4   Final      Ref Values  Desirable  < 3.4  High Risk  > 5.0    LDL Calculated 07/01/2024 113 (H)  <=99 mg/dL Final                                Near   Borderline      AGE      Desirable  Optimal    High     High     Very High     0-19 Y     0 - 109     ---    110-129   >/= 130     ----    20-24 Y     0 - 119     ---    120-159   >/= 160     ----      >24 Y     0 -  99   100-129  130-159   160-189     >/=190      VLDL 07/01/2024 35  0 - 40 mg/dL Final    Triglycerides 07/01/2024 176 (H)  0 - 149 mg/dL Final       Age         Desirable   Borderline High   High     Very High   0 D-90 D    19 - 174         ----         ----        ----  91 D- 9 Y     0 -  74        75 -  99     >/= 100      ----    10-19 Y     0 -  89        90 - 129     >/= 130      ----    20-24 Y     0 - 114       115 - 149     >/= 150      ----         >24 Y     0 - 149       150 - 199    200- 499    >/= 500    Venipuncture immediately after or during the administration of Metamizole may lead to falsely low results. Testing should be performed immediately prior to Metamizole dosing.    Non HDL Cholesterol 07/01/2024 149  0 - 149 mg/dL Final          Age       Desirable   Borderline High   High     Very High     0-19 Y     0 - 119       120 - 144     >/= 145    >/= 160    20-24 Y     0 - 149       150 - 189     >/= 190      ----         >24 Y    30 mg/dL above LDL Cholesterol goal     Lab on 02/15/2024   Component Date Value Ref Range Status    WBC 02/15/2024 6.5  4.4 - 11.3 x10*3/uL Final    nRBC 02/15/2024    Final    Not Measured    RBC 02/15/2024 4.84  4.50 - 5.90 x10*6/uL Final    Hemoglobin  02/15/2024 15.4  13.5 - 17.5 g/dL Final    Hematocrit 02/15/2024 45.1  41.0 - 52.0 % Final    MCV 02/15/2024 93  80 - 100 fL Final    MCH 02/15/2024 31.8  26.0 - 34.0 pg Final    MCHC 02/15/2024 34.1  32.0 - 36.0 g/dL Final    RDW 02/15/2024 12.1  11.5 - 14.5 % Final    Platelets 02/15/2024 209  150 - 450 x10*3/uL Final    Neutrophils % 02/15/2024 53.8  40.0 - 80.0 % Final    Immature Granulocytes %, Automated 02/15/2024 0.3  0.0 - 0.9 % Final    Immature Granulocyte Count (IG) includes promyelocytes, myelocytes and metamyelocytes but does not include bands. Percent differential counts (%) should be interpreted in the context of the absolute cell counts (cells/UL).    Lymphocytes % 02/15/2024 34.8  13.0 - 44.0 % Final    Monocytes % 02/15/2024 7.7  2.0 - 10.0 % Final    Eosinophils % 02/15/2024 2.8  0.0 - 6.0 % Final    Basophils % 02/15/2024 0.6  0.0 - 2.0 % Final    Neutrophils Absolute 02/15/2024 3.47  1.60 - 5.50 x10*3/uL Final    Percent differential counts (%) should be interpreted in the context of the absolute cell counts (cells/uL).    Immature Granulocytes Absolute, Au* 02/15/2024 0.02  0.00 - 0.50 x10*3/uL Final    Lymphocytes Absolute 02/15/2024 2.25  0.80 - 3.00 x10*3/uL Final    Monocytes Absolute 02/15/2024 0.50  0.05 - 0.80 x10*3/uL Final    Eosinophils Absolute 02/15/2024 0.18  0.00 - 0.40 x10*3/uL Final    Basophils Absolute 02/15/2024 0.04  0.00 - 0.10 x10*3/uL Final    Glucose 02/15/2024 96  74 - 99 mg/dL Final    Sodium 02/15/2024 141  136 - 145 mmol/L Final    Potassium 02/15/2024 4.2  3.5 - 5.3 mmol/L Final    Chloride 02/15/2024 105  98 - 107 mmol/L Final    Bicarbonate 02/15/2024 26  21 - 32 mmol/L Final    Anion Gap 02/15/2024 14  10 - 20 mmol/L Final    Urea Nitrogen 02/15/2024 11  6 - 23 mg/dL Final    Creatinine 02/15/2024 0.80  0.50 - 1.30 mg/dL Final    eGFR 02/15/2024 >90  >60 mL/min/1.73m*2 Final    Calculations of estimated GFR are performed using the 2021 CKD-EPI Study Refit  equation without the race variable for the IDMS-Traceable creatinine methods.  https://jasn.asnjournals.org/content/early/2021/09/22/ASN.6594831562    Calcium 02/15/2024 9.8  8.6 - 10.6 mg/dL Final    Albumin 02/15/2024 4.4  3.4 - 5.0 g/dL Final    Alkaline Phosphatase 02/15/2024 77  33 - 136 U/L Final    Total Protein 02/15/2024 6.9  6.4 - 8.2 g/dL Final    AST 02/15/2024 21  9 - 39 U/L Final    Bilirubin, Total 02/15/2024 0.7  0.0 - 1.2 mg/dL Final    ALT 02/15/2024 24  10 - 52 U/L Final    Patients treated with Sulfasalazine may generate falsely decreased results for ALT.    Iron 02/15/2024 155 (H)  35 - 150 ug/dL Final    UIBC 02/15/2024 140  110 - 370 ug/dL Final    TIBC 02/15/2024 295  240 - 445 ug/dL Final    % Saturation 02/15/2024 53 (H)  25 - 45 % Final    Ferritin 02/15/2024 51  20 - 300 ng/mL Final    Alpha-Fetoprotein 02/15/2024 4  0 - 9 ng/mL Final         REVIEW OF SYSTEMS:    Review of Systems         Objective      Vitals:    01/07/25 0937   BP: 128/74   BP Location: Left arm   Patient Position: Sitting   BP Cuff Size: Large adult   Pulse: 53   Temp: 36.1 °C (97 °F)   TempSrc: Temporal   SpO2: 98%   Weight: 101 kg (222 lb 3.2 oz)                       PHYSICAL:      Patient is alert and oriented x 3 , NAD  HEAD- normocephalic and atraumatic   EYES-conjunctiva-normal, lids - normal  EARS/NOSE- normal external exam   NECK-supple,FROM  CV- RRR without murmur  PULM- CTA bilaterally, normal respiratory effort  RESPIRATORY EFFORT- normal , no retractions or nasal flaring   ABD- normoactive BS's   EXT- no edema,NT  SKIN- no abnormal skin lesions noted  NEURO- no focal deficits  PSYCH- pleasant, normal judgement and insight      BP Readings from Last 3 Encounters:   01/07/25 128/74   12/18/24 154/73   08/22/24 145/79             Wt Readings from Last 3 Encounters:   01/07/25 101 kg (222 lb 3.2 oz)   12/18/24 102 kg (225 lb)   08/22/24 98.8 kg (217 lb 13 oz)           BMI Readings from Last 3 Encounters:    01/07/25 31.88 kg/m²   12/18/24 32.28 kg/m²   08/22/24 31.25 kg/m²               The number and complexity of problems addressed is considered moderate.  The amount and/or complexity of data reviewed and analyzed is considered moderate. The risk of complications and/or morbidity/mortality of patient is considered moderate. Overall, this patient encounter is considered a moderate risk visit.    Common Side Effects- Beta-blockers:    Dizziness  Feeling tired  Feeling lightheaded  Vision problems  Swelling of the hands, feet, ankles, or legs  Decreased sexual ability    Call your doctor if you have any of these signs:  Chest pain  Irregular heartbeat  Painful erection in men       Assessment/Plan      Assessment & Plan  Vitamin D deficiency  Taking supplement         Prediabetes  Diet and exercise        Hypercholesterolemia  No Medication   Orders:    Follow Up In [facility] - PCP - Established    Vitamin B12 deficiency         Allergic rhinitis, unspecified seasonality, unspecified trigger  flonase       ELISEO (obstructive sleep apnea)         Other migraine without status migrainosus, not intractable  propranolol       Cold intolerance                   Continue medication      Ordered lab      Follow up in 6 months        Time spent during the office visit includes-    updating and familiarizing myself with patients past medical history, social history, and allergies :  discussing ROS ;  obtaining direct  chief complaint and history of present illness from patient ,  reviewing and reconciling current medication list - both prescription and over the counter medications    clinically examine patient    determine what testing if any is needed to  diagnose the condition/conditions  with which patient is presenting    using medical knowledge to put all of the information together and decide on best course of action to proceed with diagnosis  and  treatment for the presenting problem or problems      Pre-visit  planning, chart review, and face-to-face encounter   Discussion of medications  Coordination with office staff for med adjustments   Final documentation of visit        My total time spent caring for the patient for the day of the encounter (before,during, and after) was =     >30     total minutes.    (Prep+during visit+post visit)

## 2024-12-18 ENCOUNTER — APPOINTMENT (OUTPATIENT)
Dept: NEUROLOGY | Facility: CLINIC | Age: 69
End: 2024-12-18
Payer: MEDICARE

## 2024-12-18 VITALS
SYSTOLIC BLOOD PRESSURE: 154 MMHG | HEIGHT: 70 IN | DIASTOLIC BLOOD PRESSURE: 73 MMHG | BODY MASS INDEX: 32.21 KG/M2 | WEIGHT: 225 LBS | HEART RATE: 70 BPM

## 2024-12-18 DIAGNOSIS — G43.809 OTHER MIGRAINE WITHOUT STATUS MIGRAINOSUS, NOT INTRACTABLE: ICD-10-CM

## 2024-12-18 PROCEDURE — 1157F ADVNC CARE PLAN IN RCRD: CPT | Performed by: PSYCHIATRY & NEUROLOGY

## 2024-12-18 PROCEDURE — 99204 OFFICE O/P NEW MOD 45 MIN: CPT | Performed by: PSYCHIATRY & NEUROLOGY

## 2024-12-18 PROCEDURE — 1123F ACP DISCUSS/DSCN MKR DOCD: CPT | Performed by: PSYCHIATRY & NEUROLOGY

## 2024-12-18 PROCEDURE — 3008F BODY MASS INDEX DOCD: CPT | Performed by: PSYCHIATRY & NEUROLOGY

## 2024-12-18 PROCEDURE — 3077F SYST BP >= 140 MM HG: CPT | Performed by: PSYCHIATRY & NEUROLOGY

## 2024-12-18 PROCEDURE — 1036F TOBACCO NON-USER: CPT | Performed by: PSYCHIATRY & NEUROLOGY

## 2024-12-18 PROCEDURE — 1125F AMNT PAIN NOTED PAIN PRSNT: CPT | Performed by: PSYCHIATRY & NEUROLOGY

## 2024-12-18 PROCEDURE — 1159F MED LIST DOCD IN RCRD: CPT | Performed by: PSYCHIATRY & NEUROLOGY

## 2024-12-18 PROCEDURE — 3078F DIAST BP <80 MM HG: CPT | Performed by: PSYCHIATRY & NEUROLOGY

## 2024-12-18 ASSESSMENT — ENCOUNTER SYMPTOMS
SINUS PAIN: 1
NUMBNESS: 1
FREQUENCY: 1
NECK PAIN: 1
HEADACHES: 1
BACK PAIN: 1
FATIGUE: 1

## 2024-12-18 ASSESSMENT — PAIN SCALES - GENERAL: PAINLEVEL_OUTOF10: 3

## 2024-12-18 NOTE — PATIENT INSTRUCTIONS
Recommend that you start working on an exercise program - chair yoga for core strength and aerobic exercise (biking, etc) are good options to help with balance. Please try for 3-4 times a week of 30 minutes of activity.  If things are stable I can see you back in 6 months to 1 year.

## 2024-12-18 NOTE — PROGRESS NOTES
Subjective     Mitchell Stock is a right handed  69 y.o. year old male who presents with Migraine (NPV, rmd- ).    Visit type: new patient visit    Migraine   Associated symptoms include back pain, neck pain and numbness.        His neurology group is moving away from him.    He has a long history of migraine. Propranolol works well for him. His typical migraine pain in the front of the head which makes him feel dizzy.  He tries to take rizatriptan quickly.  It gets set off by bright lights or by loud noises that are repeated.   He would have photophobia, phonophobia and nausea.  They would last several hours.  He has been on propranolol for 40 more years.      He takes 60 mg in the morning and 30 mg at night.  He is taking the 60 mg in the morning that has helped the tremors.     He does not think the tremors run in the family. He doesn't notice them.  He is always moving his hands or feet.  He has not problem doing his activities of daily living.      As he ages he has noticed some problems with balance and walking. Sometimes when he walks he feels that he isn't lifting up his foot and it drags.  Sometimes he doesn't stand up fully and is more hunched.   He does not exercise.  He has not done any PT for his balance.  He does see chiropractor for his neck and back once a month.     Occasional numbness in the arms.      Review of Systems   Constitutional:  Positive for fatigue.   HENT:  Positive for sinus pain.    Genitourinary:  Positive for frequency.   Musculoskeletal:  Positive for back pain, gait problem and neck pain.   Neurological:  Positive for numbness and headaches.     All other systems have been reviewed and are negative for complaint.     Past Medical History:   Diagnosis Date    Acute upper respiratory infection, unspecified 11/09/2016    Viral URI with cough    Allergic rhinitis     Contusion of unspecified front wall of thorax, initial encounter 03/28/2017    Contusion of chest wall     Cough, unspecified 01/20/2017    Cough    Cramp and spasm     Leg cramps    Encounter for follow-up examination after completed treatment for conditions other than malignant neoplasm 07/07/2017    Hospital discharge follow-up    Encounter for screening for diabetes mellitus 02/27/2017    Diabetes mellitus screening    Encounter for screening for malignant neoplasm of colon     Encounter for screening colonoscopy    Encounter for screening for malignant neoplasm of prostate 02/27/2017    Prostate cancer screening    GERD (gastroesophageal reflux disease)     Hereditary and idiopathic neuropathy, unspecified 07/20/2013    Hereditary and idiopathic neuropathy    History of depression 12/28/2023    Impacted cerumen, bilateral 07/03/2017    Bilateral impacted cerumen    Influenza due to unidentified influenza virus with other respiratory manifestations 04/07/2018    Influenza-like illness    Low ferritin level 12/28/2023    Migraine, unspecified, not intractable, without status migrainosus 08/26/2016    Headache, migraine    Muscle pain 10/09/2023    Nasal congestion 12/28/2023    Nasal crusting 12/28/2023    Nerve root disorder 12/28/2023    Neuralgia and neuritis, unspecified 04/29/2016    Radicular pain in right arm    Nosebleed     Obesity due to energy imbalance 12/28/2023    Otalgia, left ear 07/10/2017    Ear pain, left    Other abnormal glucose 04/02/2019    Abnormal glucose level    Other conditions influencing health status 03/29/2019    History of cough    Other specified abnormal findings of blood chemistry     High serum cholestanol    Otitis media, unspecified, left ear 08/18/2017    Otitis media, left    Otitis media, unspecified, left ear 07/17/2017    Acute left otitis media    Otitis media, unspecified, right ear 07/03/2017    Right otitis media    Otorrhea, left ear 08/18/2017    Ear drainage, left    Pain of upper extremity 10/09/2023    Perforation of intestine (nontraumatic) 05/17/2021    Bowel  perforation    Personal history of other diseases of male genital organs     History of prostate disorder    Personal history of other diseases of male genital organs 03/31/2014    History of prostatitis    Personal history of other diseases of the digestive system 05/17/2021    History of diverticulitis of colon    Personal history of other diseases of the digestive system     History of gastroesophageal reflux (GERD)    Personal history of other diseases of the musculoskeletal system and connective tissue 04/29/2016    History of osteopenia    Personal history of other diseases of the nervous system and sense organs     History of sleep apnea    Personal history of other diseases of the nervous system and sense organs 04/29/2016    History of neuropathy    Personal history of other diseases of the respiratory system 12/28/2017    History of acute bronchitis    Personal history of other diseases of the respiratory system 01/20/2017    History of acute pharyngitis    Personal history of other diseases of the respiratory system 02/19/2015    History of bronchitis    Personal history of other diseases of the respiratory system 03/29/2019    History of acute sinusitis    Personal history of other drug therapy     COVID-19 vaccine series completed    Personal history of other endocrine, nutritional and metabolic disease     History of obesity    Personal history of other malignant neoplasm of skin     History of malignant neoplasm of skin    Personal history of other mental and behavioral disorders     History of depression    Personal history of other specified conditions     History of heartburn    Personal history of other specified conditions     History of snoring    Pleurodynia 03/28/2017    Rib pain on right side    Pruritus, unspecified 04/06/2021    Ear itch    Radiculopathy, site unspecified 04/29/2016    Radicular neuropathy    Rhinitis     Sensorineural hearing loss, bilateral 08/18/2017    Asymmetrical  sensorineural hearing loss    Sleep apnea      Family History   Problem Relation Name Age of Onset    Multiple sclerosis Mother      COPD Father Bernard Stock     Hiatal hernia Father Bernard Stock     Hypertension Father Bernard Stock     Prostate cancer Father Bernard Stock     Skin cancer Father Bernard Stock     Hearing loss Father Bernard Stock     Migraines Father Bernard Stock     Colon cancer Other Cousin      Past Surgical History:   Procedure Laterality Date    COLONOSCOPY  02/27/2017    Complete Colonoscopy    KNEE ARTHROSCOPY W/ ARTHROTOMY  04/16/2020    Arthrotomy Of Knee With Open Meniscus Repair    OTHER SURGICAL HISTORY  11/30/2022    Endoscopy    OTHER SURGICAL HISTORY  03/31/2014    Mohs Micrographic Surgery Ear Left    OTHER SURGICAL HISTORY  05/17/2021    Colon surgery    OTHER SURGICAL HISTORY  04/06/2021    Laparoscopy    PROSTATE SURGERY  08/24/2016    Prostate Surgery    SINUS SURGERY  05/15/2023      Social History     Tobacco Use    Smoking status: Never     Passive exposure: Never    Smokeless tobacco: Never   Substance Use Topics    Alcohol use: Never          Objective     Neurological Exam    Physical Exam    On general examination, the patient is well appearing and well groomed. Heart is regular, rate and rhythm. Lungs are clear to ausculation bilaterally. There is no peripheral edema. Normal pedal pulses bilaterally. No carotid bruits.   On neurologic examination, the mental status is unremarkable to informal testing. The history is related in quite good detail with no obvious deficit of attention, memory or language. Fund of knowledge is adequate. Orientation is intact to person, place and time. Spontaneous speech is fluent with no paraphasic or aphasic errors. On cranial nerve exam, the pupils are equal, round and reactive to light. Extraocular movements are full, without nystagmus. She reports no double vision on sustained upgaze or lateral gaze. Visual fields are full to  confrontation. The fundi are benign with normal sharp disc margins. There is no bulbofacial weakness or ptosis. There is no ptosis with sustained upgaze. The tongue is midline with no wasting or fasciculations. The palate elevates symmetrically. Facial sensation is intact to light touch and pinprick bilaterally. Hearing is intact to finger rub bilaterally. Shoulder shrug is 5/5 bilaterally.  Neck flexion and extension have full strength. Motor examination reveals normal tone and bulk in the upper and lower extremities bilaterally. There are no fasciculations. There is full strength in the proximal and distal muscles of the upper and lower extremities bilaterally. Deep tendon reflexes are 2/4 and symmetric throughout the upper and lower extremities bilaterally, including the ankle jerks. Plantar responses are flexor bilaterally. Fine finger movements and rapid alternating movements are done well in both hands. There is very minimal kinetic tremor. On coordination testing, finger-nose-finger testing are done well bilaterally. Sensory examination reveals normal light touch, pinprick and vibratory sensation in the distal upper and lower extremities bilaterally. Gait is normal although he has difficulty performing tandem gait.       Assessment/Plan   Mr. Stock is a 69 year old man with a history of migraines and essential tremor presenting to [facility] care.  Well controlled on current medications of propranolol for migraine prevention and tremor as well as rizatriptan for abortive treatment. Will continue current management.    Reports imbalance noticed with aging. Neurologic exam is normal. Recommend exercise.  Will monitor.    Follow up in 6 months to 1 year.    Sherley Hanna DO

## 2024-12-20 ENCOUNTER — APPOINTMENT (OUTPATIENT)
Dept: OTOLARYNGOLOGY | Facility: CLINIC | Age: 69
End: 2024-12-20
Payer: MEDICARE

## 2024-12-27 ENCOUNTER — APPOINTMENT (OUTPATIENT)
Dept: OTOLARYNGOLOGY | Facility: CLINIC | Age: 69
End: 2024-12-27
Payer: MEDICARE

## 2025-01-03 ENCOUNTER — APPOINTMENT (OUTPATIENT)
Dept: OTOLARYNGOLOGY | Facility: CLINIC | Age: 70
End: 2025-01-03
Payer: MEDICARE

## 2025-01-03 DIAGNOSIS — J32.4 CHRONIC PANSINUSITIS: Primary | ICD-10-CM

## 2025-01-03 DIAGNOSIS — R04.0 NASAL BLEEDING: ICD-10-CM

## 2025-01-03 PROCEDURE — 1160F RVW MEDS BY RX/DR IN RCRD: CPT | Performed by: NURSE PRACTITIONER

## 2025-01-03 PROCEDURE — 31231 NASAL ENDOSCOPY DX: CPT | Performed by: NURSE PRACTITIONER

## 2025-01-03 PROCEDURE — 1036F TOBACCO NON-USER: CPT | Performed by: NURSE PRACTITIONER

## 2025-01-03 PROCEDURE — 1126F AMNT PAIN NOTED NONE PRSNT: CPT | Performed by: NURSE PRACTITIONER

## 2025-01-03 PROCEDURE — 1123F ACP DISCUSS/DSCN MKR DOCD: CPT | Performed by: NURSE PRACTITIONER

## 2025-01-03 PROCEDURE — 1157F ADVNC CARE PLAN IN RCRD: CPT | Performed by: NURSE PRACTITIONER

## 2025-01-03 PROCEDURE — 99213 OFFICE O/P EST LOW 20 MIN: CPT | Performed by: NURSE PRACTITIONER

## 2025-01-03 PROCEDURE — 1159F MED LIST DOCD IN RCRD: CPT | Performed by: NURSE PRACTITIONER

## 2025-01-03 RX ORDER — MUPIROCIN 20 MG/G
1 OINTMENT TOPICAL
Qty: 30 G | Refills: 0 | Status: SHIPPED | OUTPATIENT
Start: 2025-01-03 | End: 2025-04-13

## 2025-01-03 ASSESSMENT — PATIENT HEALTH QUESTIONNAIRE - PHQ9
1. LITTLE INTEREST OR PLEASURE IN DOING THINGS: NOT AT ALL
2. FEELING DOWN, DEPRESSED OR HOPELESS: NOT AT ALL
SUM OF ALL RESPONSES TO PHQ9 QUESTIONS 1 AND 2: 0

## 2025-01-03 ASSESSMENT — PAIN SCALES - GENERAL: PAINLEVEL_OUTOF10: 0-NO PAIN

## 2025-01-03 ASSESSMENT — COLUMBIA-SUICIDE SEVERITY RATING SCALE - C-SSRS: 1. IN THE PAST MONTH, HAVE YOU WISHED YOU WERE DEAD OR WISHED YOU COULD GO TO SLEEP AND NOT WAKE UP?: NO

## 2025-01-03 NOTE — PATIENT INSTRUCTIONS
Today you were evaluated by Renata Sena CNP.    Please follow-up 1 year or sooner if needed. If you have any questions or concerns, please contact my office at [phone].     Continue irrigations once daily.  Continue flonase.    - Begin Mupirocin ointment 3 times daily FOR 2-4 WEEKS as directed. Use the pads of your fingers to apply the ointment and then sniff back gently. Do not use a cue tip or finger nail to place the ointment as this can cause further trauma. IF THE PRESCRIBED OINTMENT IS TOO EXPENSIVE THEN JUST USE OVER THE COUNTER BACITRACIN OR TRIPLE ANTIBIOTIC OINTMENT.

## 2025-01-03 NOTE — PROGRESS NOTES
"Subjective   Patient ID: Mitchell Stock \"Esa" is a 69 y.o. male who presents for No chief complaint on file..  HPI  1/3/25- Patient of Dr. Apolinar Isaac presenting for routine follow up. Patient has history of bilateral FESS on 5/15/23 for CRS. Patient notes that he has been doing very well. He has been irrigating twice daily with saline and then uses flonase following this. He has not had any sinus infections. He has had pretty clear irrigations and occasional red in the irrigation. He denies any facial pain or pressure. He has been breathing well through his nose.     Review of Systems  Review of systems is negative for constitutional, ophthalmological, cardiac, pulmonary, renal, gastrointestinal, musculoskeletal, mental health, endocrine, or neurologic disorders (except as listed in the HPI, PMH, and Problem List).     Objective   Physical Exam  CONSTITUTIONAL: Vital signs reviewed. Patient appears well developed and well nourished.   GENERAL: this is a healthy appearing male who appears stated age. The patient is alert and appropriately verbally conversant without hoarseness. This patient is in no apparent distress.   FACE: The face was inspected and no cutaneous masses or lesions were visualized. There was no erythema or edema noted. Facial movement was symmetric. No skin lesions were detected.   EYES: Extra-ocular muscle function was intact. No nystagmus was observed. Pupils were equal.     NOSE: Examination of the nose revealed the nasal dorsum to be midline. Intranasal exam reveals the septum is midline. The inferior turbinates were healthy. No masses, polyps, mucopus, or other lesion on anterior rhinoscopy. See below procedure note as applicable for further exam.    RESPIRATORY: Normal inspiration and expiration and chest wall expansion, no use of accessory muscles to breathe, no stridor.  NEUROLOGICAL: Patient is ambulatory without assist. Mentation is clear. Answering questions appropriately. " "    Nasal / sinus endoscopy    Date/Time: 1/3/2025 10:27 AM    Performed by: DOUG Ludwig  Authorized by: DOUG Ludwig    Consent:     Consent obtained:  Verbal    Consent given by:  Patient    Risks discussed:  Bleeding, infection and pain    Alternatives discussed:  No treatment, observation and delayed treatment  Procedure details:     Indications: assessment of airway and sino-nasal symptoms      Medication:  Afrin and Ritchie-Synephrine 2%    Instrument: flexible fiberoptic nasal endoscope      Scope location: bilateral nare    Post-procedure details:     Patient tolerance of procedure:  Tolerated well, no immediate complications  Comments:      Findings: After topical decongestion with decongestant and anesthetic spray, nasal endoscopy was performed using an endoscope. Nasal cavities are clear. Sphenoidotomies are clear. The maxillary and ethmoid sinuses are widely patent. The frontal recesses were clear. Frontal sinusotomies widely patent. Sinuses are PRISTINE. Otherwise the inferior, middle, superior turbinates and meatuses, sphenoethmoid recess, nasopharynx, nasal cavity and septum were all normal. No pus or polyps were noted. There is no CSF leak. The patient tolerated the procedure well and there were no complications.     Assessment/Plan     Mitchell Stock \"Enrique\" is a 69 y.o. male h/o bilateral chronic pansinusitis, DNS, ITH, bilateral CB, nasal obstruction/drainage s/p FESS (total), septoplasty, bilateral ITR, bilateral CB resection, and removal of middle turbinates. The patient had surgery 5/15/2023.  5/26/23 - 1st post op. Expected post op swelling today. Debridement performed as above. Continue saline irrigations at least BID with Xhance after.  6/9/23 - 2nd post op. Some whitish drainage on the right side. Rx mupirocin irrigations. Continue with Xhance after irrigations.   8/10/23 - Sinuses PRISTINE. Reduce Xhance to 1 spray daily for 2 months. Then, reduce to 1 " spray every other day with Flonase at intervening times.  12/28/23 - Sinuses PRISTINE. Continue Xhance 1 spray every other day until finished. Continue Flonase. Continue saline irrig once or twice daily per patient preference.  6/27/24 - Sinuses PRISTINE. Continue saline irrigations BID, Flonase 1 spray once daily. Follow up with Jaida.  1/3/25- Sinuses PRISTINE. Some irritation to left septum. Will continue irrigations and flonase.        Plan    Nasal endoscopy. Findings: as noted.  Continue saline irrigations once or twice daily.  I recommended that the patient continue Flonase, 1 spray in each nostril once daily  He was provided with mupirocin ointment, twice daily, to place on the pad of the thumb and swipe into the nostril. Prescribed today.  Follow up in 1 year or sooner if needed.

## 2025-01-07 ENCOUNTER — LAB (OUTPATIENT)
Dept: LAB | Facility: LAB | Age: 70
End: 2025-01-07
Payer: MEDICARE

## 2025-01-07 ENCOUNTER — APPOINTMENT (OUTPATIENT)
Dept: PRIMARY CARE | Facility: CLINIC | Age: 70
End: 2025-01-07
Payer: MEDICARE

## 2025-01-07 VITALS
TEMPERATURE: 97 F | WEIGHT: 222.2 LBS | DIASTOLIC BLOOD PRESSURE: 74 MMHG | OXYGEN SATURATION: 98 % | BODY MASS INDEX: 31.88 KG/M2 | HEART RATE: 53 BPM | SYSTOLIC BLOOD PRESSURE: 128 MMHG

## 2025-01-07 DIAGNOSIS — E78.00 HYPERCHOLESTEROLEMIA: Primary | Chronic | ICD-10-CM

## 2025-01-07 DIAGNOSIS — R73.03 PREDIABETES: Chronic | ICD-10-CM

## 2025-01-07 DIAGNOSIS — G47.33 OSA (OBSTRUCTIVE SLEEP APNEA): Chronic | ICD-10-CM

## 2025-01-07 DIAGNOSIS — E53.8 VITAMIN B12 DEFICIENCY: Chronic | ICD-10-CM

## 2025-01-07 DIAGNOSIS — R68.89 COLD INTOLERANCE: Chronic | ICD-10-CM

## 2025-01-07 DIAGNOSIS — E78.00 HYPERCHOLESTEROLEMIA: Chronic | ICD-10-CM

## 2025-01-07 DIAGNOSIS — E55.9 VITAMIN D DEFICIENCY: Chronic | ICD-10-CM

## 2025-01-07 DIAGNOSIS — G43.809 OTHER MIGRAINE WITHOUT STATUS MIGRAINOSUS, NOT INTRACTABLE: Chronic | ICD-10-CM

## 2025-01-07 DIAGNOSIS — J30.9 ALLERGIC RHINITIS, UNSPECIFIED SEASONALITY, UNSPECIFIED TRIGGER: Chronic | ICD-10-CM

## 2025-01-07 PROBLEM — E66.9 OBESITY WITH BODY MASS INDEX 30 OR GREATER: Chronic | Status: ACTIVE | Noted: 2023-12-28

## 2025-01-07 PROBLEM — K57.32 DIVERTICULITIS OF COLON: Status: RESOLVED | Noted: 2021-04-02 | Resolved: 2025-01-07

## 2025-01-07 PROBLEM — H90.72 MIXED CONDUCTIVE AND SENSORINEURAL HEARING LOSS OF LEFT EAR WITH UNRESTRICTED HEARING OF RIGHT EAR: Chronic | Status: ACTIVE | Noted: 2023-06-12

## 2025-01-07 PROBLEM — K21.9 GASTROESOPHAGEAL REFLUX DISEASE WITHOUT ESOPHAGITIS: Chronic | Status: ACTIVE | Noted: 2023-05-15

## 2025-01-07 PROBLEM — U07.1 COVID-19 VIRUS INFECTION: Status: RESOLVED | Noted: 2023-10-09 | Resolved: 2025-01-07

## 2025-01-07 PROBLEM — J32.9 CHRONIC SINUSITIS: Status: RESOLVED | Noted: 2023-06-12 | Resolved: 2025-01-07

## 2025-01-07 PROBLEM — E83.19 IRON OVERLOAD: Chronic | Status: ACTIVE | Noted: 2023-06-12

## 2025-01-07 PROBLEM — J34.3 HYPERTROPHY OF BOTH INFERIOR NASAL TURBINATES: Status: RESOLVED | Noted: 2023-06-12 | Resolved: 2025-01-07

## 2025-01-07 PROBLEM — R22.30 MASS OF UPPER EXTREMITY: Status: RESOLVED | Noted: 2023-12-28 | Resolved: 2025-01-07

## 2025-01-07 PROBLEM — Z85.828 HISTORY OF MALIGNANT NEOPLASM OF SKIN: Status: RESOLVED | Noted: 2023-12-28 | Resolved: 2025-01-07

## 2025-01-07 PROBLEM — G64 DISORDER OF PERIPHERAL NERVOUS SYSTEM: Status: RESOLVED | Noted: 2023-12-28 | Resolved: 2025-01-07

## 2025-01-07 PROBLEM — R25.2 CRAMPS OF LOWER EXTREMITY: Status: RESOLVED | Noted: 2023-12-28 | Resolved: 2025-01-07

## 2025-01-07 PROBLEM — F32.A DEPRESSIVE DISORDER: Chronic | Status: RESOLVED | Noted: 2024-07-02 | Resolved: 2025-01-07

## 2025-01-07 PROCEDURE — 1160F RVW MEDS BY RX/DR IN RCRD: CPT | Performed by: FAMILY MEDICINE

## 2025-01-07 PROCEDURE — 3074F SYST BP LT 130 MM HG: CPT | Performed by: FAMILY MEDICINE

## 2025-01-07 PROCEDURE — 1123F ACP DISCUSS/DSCN MKR DOCD: CPT | Performed by: FAMILY MEDICINE

## 2025-01-07 PROCEDURE — 84443 ASSAY THYROID STIM HORMONE: CPT

## 2025-01-07 PROCEDURE — 80076 HEPATIC FUNCTION PANEL: CPT

## 2025-01-07 PROCEDURE — 1036F TOBACCO NON-USER: CPT | Performed by: FAMILY MEDICINE

## 2025-01-07 PROCEDURE — 1159F MED LIST DOCD IN RCRD: CPT | Performed by: FAMILY MEDICINE

## 2025-01-07 PROCEDURE — 82306 VITAMIN D 25 HYDROXY: CPT

## 2025-01-07 PROCEDURE — 83036 HEMOGLOBIN GLYCOSYLATED A1C: CPT

## 2025-01-07 PROCEDURE — G2211 COMPLEX E/M VISIT ADD ON: HCPCS | Performed by: FAMILY MEDICINE

## 2025-01-07 PROCEDURE — 99214 OFFICE O/P EST MOD 30 MIN: CPT | Performed by: FAMILY MEDICINE

## 2025-01-07 PROCEDURE — 80061 LIPID PANEL: CPT

## 2025-01-07 PROCEDURE — 1157F ADVNC CARE PLAN IN RCRD: CPT | Performed by: FAMILY MEDICINE

## 2025-01-07 PROCEDURE — 3078F DIAST BP <80 MM HG: CPT | Performed by: FAMILY MEDICINE

## 2025-01-08 LAB
25(OH)D3 SERPL-MCNC: 78 NG/ML (ref 30–100)
ALBUMIN SERPL BCP-MCNC: 4.4 G/DL (ref 3.4–5)
ALP SERPL-CCNC: 80 U/L (ref 33–136)
ALT SERPL W P-5'-P-CCNC: 24 U/L (ref 10–52)
AST SERPL W P-5'-P-CCNC: 21 U/L (ref 9–39)
BILIRUB DIRECT SERPL-MCNC: 0.1 MG/DL (ref 0–0.3)
BILIRUB SERPL-MCNC: 0.6 MG/DL (ref 0–1.2)
CHOLEST SERPL-MCNC: 220 MG/DL (ref 0–199)
CHOLESTEROL/HDL RATIO: 4.7
EST. AVERAGE GLUCOSE BLD GHB EST-MCNC: 114 MG/DL
HBA1C MFR BLD: 5.6 %
HDLC SERPL-MCNC: 46.6 MG/DL
LDLC SERPL CALC-MCNC: 141 MG/DL
NON HDL CHOLESTEROL: 173 MG/DL (ref 0–149)
PROT SERPL-MCNC: 7.1 G/DL (ref 6.4–8.2)
TRIGL SERPL-MCNC: 160 MG/DL (ref 0–149)
TSH SERPL-ACNC: 1.82 MIU/L (ref 0.44–3.98)
VLDL: 32 MG/DL (ref 0–40)

## 2025-01-14 ENCOUNTER — TELEPHONE (OUTPATIENT)
Dept: PRIMARY CARE | Facility: CLINIC | Age: 70
End: 2025-01-14
Payer: MEDICARE

## 2025-01-14 NOTE — TELEPHONE ENCOUNTER
Pt called in stating he wife tested positive for COVID today but he test negative, and he has a conference meeting to attend tomorrow  in [place] wants to know if he test negative again tomorrow morning is it okay to still fly?

## 2025-01-30 DIAGNOSIS — G43.809 OTHER MIGRAINE WITHOUT STATUS MIGRAINOSUS, NOT INTRACTABLE: ICD-10-CM

## 2025-01-31 RX ORDER — PROPRANOLOL HYDROCHLORIDE 60 MG/1
60 TABLET ORAL DAILY
Qty: 90 TABLET | Refills: 3 | Status: SHIPPED | OUTPATIENT
Start: 2025-01-31

## 2025-02-11 ENCOUNTER — LAB (OUTPATIENT)
Dept: LAB | Facility: CLINIC | Age: 70
End: 2025-02-11
Payer: MEDICARE

## 2025-02-11 DIAGNOSIS — E83.119 HEMOCHROMATOSIS, UNSPECIFIED HEMOCHROMATOSIS TYPE: ICD-10-CM

## 2025-02-11 LAB
AFP SERPL-MCNC: <4 NG/ML (ref 0–9)
ALBUMIN SERPL BCP-MCNC: 4 G/DL (ref 3.4–5)
ALP SERPL-CCNC: 78 U/L (ref 33–136)
ALT SERPL W P-5'-P-CCNC: 10 U/L (ref 10–52)
ANION GAP SERPL CALC-SCNC: 13 MMOL/L (ref 10–20)
AST SERPL W P-5'-P-CCNC: 13 U/L (ref 9–39)
BASOPHILS # BLD AUTO: 0.02 X10*3/UL (ref 0–0.1)
BASOPHILS NFR BLD AUTO: 0.3 %
BILIRUB SERPL-MCNC: 0.4 MG/DL (ref 0–1.2)
BUN SERPL-MCNC: 17 MG/DL (ref 6–23)
CALCIUM SERPL-MCNC: 9.4 MG/DL (ref 8.6–10.6)
CHLORIDE SERPL-SCNC: 106 MMOL/L (ref 98–107)
CO2 SERPL-SCNC: 25 MMOL/L (ref 21–32)
CREAT SERPL-MCNC: 0.88 MG/DL (ref 0.5–1.3)
EGFRCR SERPLBLD CKD-EPI 2021: >90 ML/MIN/1.73M*2
EOSINOPHIL # BLD AUTO: 0.15 X10*3/UL (ref 0–0.4)
EOSINOPHIL NFR BLD AUTO: 2.3 %
ERYTHROCYTE [DISTWIDTH] IN BLOOD BY AUTOMATED COUNT: 14.1 % (ref 11.5–14.5)
FERRITIN SERPL-MCNC: 24 NG/ML (ref 20–300)
GLUCOSE SERPL-MCNC: 106 MG/DL (ref 74–99)
HCT VFR BLD AUTO: 38.1 % (ref 41–52)
HGB BLD-MCNC: 12.4 G/DL (ref 13.5–17.5)
IMM GRANULOCYTES # BLD AUTO: 0.01 X10*3/UL (ref 0–0.5)
IMM GRANULOCYTES NFR BLD AUTO: 0.2 % (ref 0–0.9)
IRON SATN MFR SERPL: 10 % (ref 25–45)
IRON SERPL-MCNC: 27 UG/DL (ref 35–150)
LYMPHOCYTES # BLD AUTO: 1.95 X10*3/UL (ref 0.8–3)
LYMPHOCYTES NFR BLD AUTO: 29.4 %
MCH RBC QN AUTO: 28.5 PG (ref 26–34)
MCHC RBC AUTO-ENTMCNC: 32.5 G/DL (ref 32–36)
MCV RBC AUTO: 88 FL (ref 80–100)
MONOCYTES # BLD AUTO: 0.64 X10*3/UL (ref 0.05–0.8)
MONOCYTES NFR BLD AUTO: 9.6 %
NEUTROPHILS # BLD AUTO: 3.87 X10*3/UL (ref 1.6–5.5)
NEUTROPHILS NFR BLD AUTO: 58.2 %
NRBC BLD-RTO: ABNORMAL /100{WBCS}
PLATELET # BLD AUTO: 258 X10*3/UL (ref 150–450)
POTASSIUM SERPL-SCNC: 4.2 MMOL/L (ref 3.5–5.3)
PROT SERPL-MCNC: 6.7 G/DL (ref 6.4–8.2)
RBC # BLD AUTO: 4.35 X10*6/UL (ref 4.5–5.9)
SODIUM SERPL-SCNC: 140 MMOL/L (ref 136–145)
TIBC SERPL-MCNC: 264 UG/DL (ref 240–445)
UIBC SERPL-MCNC: 237 UG/DL (ref 110–370)
WBC # BLD AUTO: 6.6 X10*3/UL (ref 4.4–11.3)

## 2025-02-11 PROCEDURE — 36415 COLL VENOUS BLD VENIPUNCTURE: CPT

## 2025-02-11 PROCEDURE — 82105 ALPHA-FETOPROTEIN SERUM: CPT

## 2025-02-11 PROCEDURE — 83540 ASSAY OF IRON: CPT

## 2025-02-11 PROCEDURE — 84075 ASSAY ALKALINE PHOSPHATASE: CPT

## 2025-02-11 PROCEDURE — 85025 COMPLETE CBC W/AUTO DIFF WBC: CPT

## 2025-02-11 PROCEDURE — 82728 ASSAY OF FERRITIN: CPT

## 2025-02-20 ENCOUNTER — OFFICE VISIT (OUTPATIENT)
Dept: HEMATOLOGY/ONCOLOGY | Facility: CLINIC | Age: 70
End: 2025-02-20
Payer: MEDICARE

## 2025-02-20 VITALS
DIASTOLIC BLOOD PRESSURE: 76 MMHG | OXYGEN SATURATION: 100 % | SYSTOLIC BLOOD PRESSURE: 126 MMHG | RESPIRATION RATE: 16 BRPM | WEIGHT: 207.89 LBS | TEMPERATURE: 97.7 F | BODY MASS INDEX: 30.79 KG/M2 | HEIGHT: 69 IN | HEART RATE: 60 BPM

## 2025-02-20 DIAGNOSIS — E83.119 HEMOCHROMATOSIS, UNSPECIFIED HEMOCHROMATOSIS TYPE: ICD-10-CM

## 2025-02-20 PROCEDURE — 1126F AMNT PAIN NOTED NONE PRSNT: CPT | Performed by: INTERNAL MEDICINE

## 2025-02-20 PROCEDURE — 99214 OFFICE O/P EST MOD 30 MIN: CPT | Performed by: INTERNAL MEDICINE

## 2025-02-20 PROCEDURE — 3074F SYST BP LT 130 MM HG: CPT | Performed by: INTERNAL MEDICINE

## 2025-02-20 PROCEDURE — 3078F DIAST BP <80 MM HG: CPT | Performed by: INTERNAL MEDICINE

## 2025-02-20 PROCEDURE — 1157F ADVNC CARE PLAN IN RCRD: CPT | Performed by: INTERNAL MEDICINE

## 2025-02-20 PROCEDURE — 1123F ACP DISCUSS/DSCN MKR DOCD: CPT | Performed by: INTERNAL MEDICINE

## 2025-02-20 PROCEDURE — 3008F BODY MASS INDEX DOCD: CPT | Performed by: INTERNAL MEDICINE

## 2025-02-20 ASSESSMENT — PAIN SCALES - GENERAL: PAINLEVEL_OUTOF10: 0-NO PAIN

## 2025-02-20 NOTE — PROGRESS NOTES
Patient to follow-up in 6 months with labs prior.  Patient teaching provided regarding [facility] transition and patient notified paper lab requisition will be required for non-[facility] labs.   Patient will come to [facility] for labs.  Call back instructions reviewed.  Patient verbalized understanding.

## 2025-02-20 NOTE — PATIENT INSTRUCTIONS
Call the office at [phone] with questions or needs.  You may also contact your nurse or doctor with non-urgent issues by sending a cloudswave message.

## 2025-02-20 NOTE — PROGRESS NOTES
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 He is seeing urologist for elevated PSA readings. He is interested in donating blood and has been contacted by [employer].In since last evaluation the patient presents with pain radiating down both arms.  MRI of cervical spine revealed foraminal narrowing at C4-5, C5-6, C6-7 suggesting of cervical spondylosis.     Patient had come for follow-up on February 20, 2025 regarding history of heterozygosity for hemochromatosis gene C2 82Y mutation.  Since last evaluation the patient claims to have gone on a diet and lost about 7 pounds over the last 4 weeks.  Also, the patient claims that he feels cold all the time.           Past medical history:  1. migraines,  2. GERD,   3. Hyperlipidemia.     Past surgical history:  1. Arthroscopic left knee surgery for ligament tear,   2. Removal of skin cancer from left ear.  3. perforated diverticulum s/p surgery 4/2021.   4.  Sinus  surgery in May 2023       Review of Systems:   ·  System Review All other systems have been reviewed and are negative for complaint.      · Constitutional NEGATIVE: Fever, Chills, Anorexia, Weight Loss, Malaise      · Eyes NEGATIVE: Blurry Vision, Drainage, Diploplia, Redness, Vision Loss/ Change      · ENMT NEGATIVE: Nasal Discharge, Nasal Congestion, Ear Pain, Mouth Pain, Throat Pain      · Respiratory NEGATIVE: Dry Cough, Productive Cough, Hemoptysis, Wheezing, Shortness of Breath      · Cardiology NEGATIVE: Chest Pain, Dyspnea on Exertion, Orthopnea, Palpitations, Syncope      · Gastrointestinal NEGATIVE: Abdominal Pain, Constipation, Diarrhea, Nausea, Vomiting      ·  Genitourinary NEGATIVE: Discharge, Dysuria, Flank Pain, Frequency, Hematuria      · Musculoskeletal NEGATIVE: Decreased ROM, Pain, Swelling, Stiffness, Weakness      · Neurological NEGATIVE: Dizziness, Confusion, Headache, Seizures, Syncope      · Psychiatric NEGATIVE: Mood Changes, Anxiety, Hallucinations, Sleep Changes, Suicidal Ideas      · Skin NEGATIVE: Mass, Pain, Pruritus, Rash, Ulcer      · Endocrine NEGATIVE: Heat Intolerance, Cold Intolerance, Sweat, Polyuria, Thirst      · Hematologic/Lymph NEGATIVE: Anemia, Bruising, Easy Bleeding, Night Sweats, Petechiae      · Allergic/Immunologic NEGATIVE: Anaphylaxis, Itchy/ Teary Eyes, Itching, Sneezing, Swelling      · Breast NEGATIVE: Pain, Mass, Discharge, Nipple Itching, Gynecomastia         Allergies and Intolerances:       Allergies:         No Known Allergies: Active     Outpatient Medication Profile:  * Patient Currently Takes Medications as of 24-Aug-2023 13:18 documented in Structured Notes         Afrin 0.05% nasal spray : Last Dose Taken:  , 2 spray(s) in each nostril every 4 hours, ONLY IF THERE IS SEVERE BLEEDING , Start Date: 15-May-2023         Ocean 0.65% nasal spray: Last Dose Taken:  , 4 spray(s) intranasally  every 4 hours , Start Date: 15-May-2023         Fish Oil oral capsule: Last Dose Taken:  , 1 cap(s) orally 2 times a day         tamsulosin 0.4 mg oral capsule: Last Dose Taken:  , 1 cap(s) orally once  a day         Vitamin B12: Last Dose Taken:           Vitamin D3 125 mcg (5000 intl units) oral capsule: Last Dose Taken:   , 1 cap(s) orally once a day         Zinc 140 mg (as elemental zinc 50 mg) oral tablet: Last Dose Taken:   , 1 tab(s) orally once a day         Vitamin C 1000 mg oral tablet: Last Dose Taken:  , 1 tab(s) orally once  a day         magnesium 250: Last Dose Taken:           Xhance 93 mcg/inh nasal spray: Last Dose Taken:  , 1 spray(s) nasal 2  times a day         Metamucil: Last Dose Taken:           azelastine 137 mcg/inh  (0.1%) nasal spray: Last Dose Taken:  , 2 spray(s)  nasal 2 times a day, As Needed         Calcium 500+D: Last Dose Taken:           ibuprofen 600 mg oral tablet: Last Dose Taken:  , 1 tab(s) orally every  6 hours, As Needed         propranolol 60 mg oral tablet: Last Dose Taken:  , 1 tab(s) orally 2 times  a day         omeprazole 20 mg oral delayed release capsule: Last Dose Taken:  , 1 cap(s)  orally once a day         Maxalt 10 mg oral tablet: 1 tab(s) orally once a day, As Needed             Medical History:         Elevated PSA: ICD-10: R97.20, Status: Active         History of basal cell carcinoma (BCC): ICD-10: Z85.828, Status:  Active         Sensorineural hearing loss (SNHL): ICD-10: H90.5, Status:  Active         Hypertrophy of nasal turbinates: ICD-10: J34.3, Status: Active         Deviated nasal septum: ICD-10: J34.2, Status: Active         History of diverticulitis: ICD-10: Z87.19, Status: Active         IBS (irritable bowel syndrome): ICD-10: K58.9, Status: Active         ELISEO on CPAP: ICD-10: G47.33, Status: Active         HLD (hyperlipidemia): ICD-10: E78.5, Status: Active         GERD (gastroesophageal reflux disease): ICD-10: K21.9, Status:  Active         Migraines: ICD-10: G43.909, Status: Active         Hemochromatosis: ICD-10: E83.119, Status: Active         Iron overload: ICD-10: E83.19, Status: Active       Surg History:         History of Mohs micrographic surgery for skin cancer: ICD-10:  Z85.828, Status: Active         History of laparoscopy: ICD-10: Z98.890, Status: Active         History of lateral meniscus repair of left knee: ICD-10:  Z98.890, Status: Active         History of surgery: ICD-10: Z98.890, Status: Active, Description:  Repair of perforated diverticula.         History of colonoscopy: ICD-10: Z98.890, Status: Active        Social History:   Social Substance History:  ·  Social History denies smoking, alcohol and drug use   ·  Smoking Status never smoker   ·  Tobacco Use  denies   ·  Alcohol Use denies   ·  Drug Use denies   ·  Additional History      The patient is 60 years old, , has 3 grown children. He works as a . He denies any history of smoking alcohol abuse drug abuse.     (1)           Vitals and Measurements:   Vitals: Temp: 36  HR: 57  RR: 18  BP: 123/71  SPO2%:   98   Measurements: HT(cm): 176.4  WT(kg): 99  BSA: 2.2   BMI:  31.8      Physical Exam:      Constitutional: Well developed, awake/alert/oriented  x3, no distress, alert and cooperative   Eyes: PERRL, EOMI, clear sclera   ENMT: mucous membranes moist, no apparent injury,  no lesions seen   Head/Neck: Neck supple, no apparent injury, thyroid  without mass or tenderness, No JVD, trachea midline, no bruits   Respiratory/Thorax: Patent airways, CTAB, normal  breath sounds with good chest expansion, thorax symmetric   Cardiovascular: Regular, rate and rhythm, no murmurs,  2+ equal pulses of the extremities, normal S 1and S 2   Gastrointestinal: Nondistended, soft, non-tender,  no rebound tenderness or guarding, no masses palpable, no organomegaly, +BS, no bruits   Genitourinary: No Discharge, vesicles or other abnormalities   Musculoskeletal: ROM intact, no joint swelling, normal  strength   Extremities: normal extremities, no cyanosis edema,  contusions or wounds, no clubbing   Neurological: alert and oriented x3, intact senses,  motor, response and reflexes, normal strength   Breast: No masses, tenderness, no discharge or discoloration   Lymphatic: No significant lymphadenopathy   Psychological: Appropriate mood and behavior   Skin: Warm and dry, no lesions, no rashes         Lab Results:           Assessment and Plan:      1. Heterozygosity for C282Y  The patient had called for a follow up on August 22, 2024.. Reviewed lab data with the patient.  CBC on August 16, 2023 revealed hemoglobin 15.2 g/dL.   ferritin  in reference range, serum iron and saturation mildly elevated..  Patient can donate blood  every 3 months  when advisable rather than proceed with therapeutic phlebotomies   The patient also describes pain radiating down both arms.  MRI of cervical spine in February 2024 revealed findings suggestive of cervical spondylosis/foraminal narrowing.  Suggest neurology opinion/physical therapy.    Patient had come for follow-up on February 20, 2025 regarding history of heterozygosity for hemochromatosis gene C2 82Y mutation.  Since last evaluation the patient claims to have gone on a diet and lost about 7 pounds over the last 4 weeks.  Also, the patient claims that he feels cold all the time.  CBC on February 11 revealed WBC 6.6 hemoglobin 12.4 g/dL, platelets 2 58,000/mm³.  A year ago hemoglobin was 15.4 g/dL.  Serum iron 27 mcg/dL saturation 10% ferritin 24.  All iron indicis are downward trend.  Recommend GI evaluation.  Physical examination within normal limits.  Return in 6 months.        2. Hyperglycemia     3. Obesity  I have encouraged him to remain on his diet and exercise program to reduce his weight appropriate for his height and age.      4. The patient is to return in six months.

## 2025-02-21 ENCOUNTER — OFFICE (OUTPATIENT)
Dept: URBAN - METROPOLITAN AREA CLINIC 26 | Facility: CLINIC | Age: 70
End: 2025-02-21
Payer: COMMERCIAL

## 2025-02-21 VITALS
HEART RATE: 56 BPM | DIASTOLIC BLOOD PRESSURE: 81 MMHG | SYSTOLIC BLOOD PRESSURE: 130 MMHG | TEMPERATURE: 98 F | HEIGHT: 70 IN | WEIGHT: 206 LBS | SYSTOLIC BLOOD PRESSURE: 149 MMHG | DIASTOLIC BLOOD PRESSURE: 89 MMHG

## 2025-02-21 DIAGNOSIS — Z86.0100 PERSONAL HISTORY OF COLON POLYPS, UNSPECIFIED: ICD-10-CM

## 2025-02-21 DIAGNOSIS — K21.9 GASTRO-ESOPHAGEAL REFLUX DISEASE WITHOUT ESOPHAGITIS: ICD-10-CM

## 2025-02-21 DIAGNOSIS — E83.110 HEREDITARY HEMOCHROMATOSIS: ICD-10-CM

## 2025-02-21 DIAGNOSIS — K57.90 DIVERTICULOSIS OF INTESTINE, PART UNSPECIFIED, WITHOUT PERFO: ICD-10-CM

## 2025-02-21 DIAGNOSIS — D50.9 IRON DEFICIENCY ANEMIA, UNSPECIFIED: ICD-10-CM

## 2025-02-21 PROCEDURE — 99214 OFFICE O/P EST MOD 30 MIN: CPT

## 2025-02-27 VITALS
HEART RATE: 52 BPM | RESPIRATION RATE: 13 BRPM | OXYGEN SATURATION: 99 % | HEART RATE: 51 BPM | SYSTOLIC BLOOD PRESSURE: 129 MMHG | HEART RATE: 64 BPM | DIASTOLIC BLOOD PRESSURE: 76 MMHG | OXYGEN SATURATION: 100 % | WEIGHT: 205 LBS | SYSTOLIC BLOOD PRESSURE: 115 MMHG | RESPIRATION RATE: 10 BRPM | RESPIRATION RATE: 10 BRPM | HEART RATE: 64 BPM | HEART RATE: 62 BPM | HEART RATE: 61 BPM | DIASTOLIC BLOOD PRESSURE: 76 MMHG | RESPIRATION RATE: 11 BRPM | HEART RATE: 53 BPM | DIASTOLIC BLOOD PRESSURE: 85 MMHG | HEART RATE: 53 BPM | OXYGEN SATURATION: 97 % | DIASTOLIC BLOOD PRESSURE: 69 MMHG | RESPIRATION RATE: 7 BRPM | HEART RATE: 57 BPM | SYSTOLIC BLOOD PRESSURE: 145 MMHG | DIASTOLIC BLOOD PRESSURE: 62 MMHG | DIASTOLIC BLOOD PRESSURE: 75 MMHG | DIASTOLIC BLOOD PRESSURE: 69 MMHG | DIASTOLIC BLOOD PRESSURE: 82 MMHG | RESPIRATION RATE: 7 BRPM | HEART RATE: 61 BPM | OXYGEN SATURATION: 88 % | TEMPERATURE: 97.3 F | HEIGHT: 69 IN | RESPIRATION RATE: 15 BRPM | OXYGEN SATURATION: 96 % | RESPIRATION RATE: 11 BRPM | RESPIRATION RATE: 15 BRPM | HEART RATE: 59 BPM | SYSTOLIC BLOOD PRESSURE: 138 MMHG | HEIGHT: 69 IN | HEART RATE: 52 BPM | SYSTOLIC BLOOD PRESSURE: 109 MMHG | TEMPERATURE: 97.3 F | OXYGEN SATURATION: 88 % | SYSTOLIC BLOOD PRESSURE: 115 MMHG | RESPIRATION RATE: 14 BRPM | OXYGEN SATURATION: 96 % | DIASTOLIC BLOOD PRESSURE: 68 MMHG | SYSTOLIC BLOOD PRESSURE: 122 MMHG | DIASTOLIC BLOOD PRESSURE: 75 MMHG | DIASTOLIC BLOOD PRESSURE: 82 MMHG | DIASTOLIC BLOOD PRESSURE: 68 MMHG | HEART RATE: 62 BPM | SYSTOLIC BLOOD PRESSURE: 109 MMHG | WEIGHT: 205 LBS | RESPIRATION RATE: 14 BRPM | SYSTOLIC BLOOD PRESSURE: 114 MMHG | HEART RATE: 59 BPM | HEART RATE: 57 BPM | DIASTOLIC BLOOD PRESSURE: 85 MMHG | OXYGEN SATURATION: 97 % | SYSTOLIC BLOOD PRESSURE: 122 MMHG | OXYGEN SATURATION: 100 % | SYSTOLIC BLOOD PRESSURE: 114 MMHG | OXYGEN SATURATION: 99 % | RESPIRATION RATE: 13 BRPM | SYSTOLIC BLOOD PRESSURE: 129 MMHG | DIASTOLIC BLOOD PRESSURE: 62 MMHG | SYSTOLIC BLOOD PRESSURE: 145 MMHG | HEART RATE: 51 BPM | SYSTOLIC BLOOD PRESSURE: 138 MMHG

## 2025-03-05 PROBLEM — K21.9 CHRONIC GERD: Status: ACTIVE | Noted: 2025-03-06

## 2025-03-06 ENCOUNTER — AMBULATORY SURGICAL CENTER (OUTPATIENT)
Dept: URBAN - METROPOLITAN AREA SURGERY 12 | Facility: SURGERY | Age: 70
End: 2025-03-06
Payer: MEDICARE

## 2025-03-06 ENCOUNTER — OFFICE (OUTPATIENT)
Dept: URBAN - METROPOLITAN AREA PATHOLOGY 2 | Facility: PATHOLOGY | Age: 70
End: 2025-03-06
Payer: MEDICARE

## 2025-03-06 DIAGNOSIS — K31.A0 GASTRIC INTESTINAL METAPLASIA, UNSPECIFIED: ICD-10-CM

## 2025-03-06 DIAGNOSIS — K22.89 OTHER SPECIFIED DISEASE OF ESOPHAGUS: ICD-10-CM

## 2025-03-06 DIAGNOSIS — D50.9 IRON DEFICIENCY ANEMIA, UNSPECIFIED: ICD-10-CM

## 2025-03-06 DIAGNOSIS — K21.00 GASTRO-ESOPHAGEAL REFLUX DISEASE WITH ESOPHAGITIS, WITHOUT B: ICD-10-CM

## 2025-03-06 DIAGNOSIS — K44.9 DIAPHRAGMATIC HERNIA WITHOUT OBSTRUCTION OR GANGRENE: ICD-10-CM

## 2025-03-06 DIAGNOSIS — K31.89 OTHER DISEASES OF STOMACH AND DUODENUM: ICD-10-CM

## 2025-03-06 DIAGNOSIS — K21.9 GASTRO-ESOPHAGEAL REFLUX DISEASE WITHOUT ESOPHAGITIS: ICD-10-CM

## 2025-03-06 PROCEDURE — 88342 IMHCHEM/IMCYTCHM 1ST ANTB: CPT | Performed by: PATHOLOGY

## 2025-03-06 PROCEDURE — 43239 EGD BIOPSY SINGLE/MULTIPLE: CPT | Performed by: INTERNAL MEDICINE

## 2025-03-06 PROCEDURE — 88313 SPECIAL STAINS GROUP 2: CPT | Performed by: PATHOLOGY

## 2025-03-06 PROCEDURE — 88305 TISSUE EXAM BY PATHOLOGIST: CPT | Performed by: PATHOLOGY

## 2025-04-06 VITALS
DIASTOLIC BLOOD PRESSURE: 59 MMHG | DIASTOLIC BLOOD PRESSURE: 57 MMHG | DIASTOLIC BLOOD PRESSURE: 61 MMHG | TEMPERATURE: 97.9 F | RESPIRATION RATE: 11 BRPM | SYSTOLIC BLOOD PRESSURE: 138 MMHG | DIASTOLIC BLOOD PRESSURE: 58 MMHG | SYSTOLIC BLOOD PRESSURE: 141 MMHG | TEMPERATURE: 97.9 F | SYSTOLIC BLOOD PRESSURE: 122 MMHG | DIASTOLIC BLOOD PRESSURE: 71 MMHG | SYSTOLIC BLOOD PRESSURE: 141 MMHG | OXYGEN SATURATION: 96 % | OXYGEN SATURATION: 97 % | OXYGEN SATURATION: 97 % | DIASTOLIC BLOOD PRESSURE: 62 MMHG | SYSTOLIC BLOOD PRESSURE: 125 MMHG | DIASTOLIC BLOOD PRESSURE: 65 MMHG | DIASTOLIC BLOOD PRESSURE: 59 MMHG | HEART RATE: 72 BPM | SYSTOLIC BLOOD PRESSURE: 123 MMHG | DIASTOLIC BLOOD PRESSURE: 71 MMHG | DIASTOLIC BLOOD PRESSURE: 61 MMHG | RESPIRATION RATE: 10 BRPM | OXYGEN SATURATION: 98 % | HEART RATE: 71 BPM | DIASTOLIC BLOOD PRESSURE: 59 MMHG | SYSTOLIC BLOOD PRESSURE: 123 MMHG | SYSTOLIC BLOOD PRESSURE: 125 MMHG | SYSTOLIC BLOOD PRESSURE: 115 MMHG | DIASTOLIC BLOOD PRESSURE: 54 MMHG | HEART RATE: 70 BPM | SYSTOLIC BLOOD PRESSURE: 110 MMHG | DIASTOLIC BLOOD PRESSURE: 54 MMHG | SYSTOLIC BLOOD PRESSURE: 118 MMHG | SYSTOLIC BLOOD PRESSURE: 111 MMHG | SYSTOLIC BLOOD PRESSURE: 115 MMHG | OXYGEN SATURATION: 96 % | HEART RATE: 68 BPM | HEART RATE: 70 BPM | HEART RATE: 56 BPM | HEART RATE: 73 BPM | SYSTOLIC BLOOD PRESSURE: 112 MMHG | DIASTOLIC BLOOD PRESSURE: 57 MMHG | SYSTOLIC BLOOD PRESSURE: 122 MMHG | HEIGHT: 69 IN | DIASTOLIC BLOOD PRESSURE: 76 MMHG | DIASTOLIC BLOOD PRESSURE: 69 MMHG | OXYGEN SATURATION: 98 % | DIASTOLIC BLOOD PRESSURE: 64 MMHG | DIASTOLIC BLOOD PRESSURE: 71 MMHG | DIASTOLIC BLOOD PRESSURE: 55 MMHG | RESPIRATION RATE: 12 BRPM | SYSTOLIC BLOOD PRESSURE: 116 MMHG | SYSTOLIC BLOOD PRESSURE: 110 MMHG | HEART RATE: 56 BPM | RESPIRATION RATE: 12 BRPM | DIASTOLIC BLOOD PRESSURE: 61 MMHG | SYSTOLIC BLOOD PRESSURE: 116 MMHG | HEART RATE: 68 BPM | RESPIRATION RATE: 10 BRPM | RESPIRATION RATE: 11 BRPM | DIASTOLIC BLOOD PRESSURE: 64 MMHG | RESPIRATION RATE: 10 BRPM | DIASTOLIC BLOOD PRESSURE: 58 MMHG | HEART RATE: 71 BPM | RESPIRATION RATE: 13 BRPM | HEART RATE: 69 BPM | OXYGEN SATURATION: 96 % | RESPIRATION RATE: 11 BRPM | DIASTOLIC BLOOD PRESSURE: 55 MMHG | SYSTOLIC BLOOD PRESSURE: 117 MMHG | SYSTOLIC BLOOD PRESSURE: 117 MMHG | DIASTOLIC BLOOD PRESSURE: 65 MMHG | SYSTOLIC BLOOD PRESSURE: 138 MMHG | WEIGHT: 195 LBS | DIASTOLIC BLOOD PRESSURE: 58 MMHG | DIASTOLIC BLOOD PRESSURE: 69 MMHG | HEART RATE: 72 BPM | DIASTOLIC BLOOD PRESSURE: 64 MMHG | HEIGHT: 69 IN | SYSTOLIC BLOOD PRESSURE: 125 MMHG | SYSTOLIC BLOOD PRESSURE: 122 MMHG | HEART RATE: 69 BPM | SYSTOLIC BLOOD PRESSURE: 117 MMHG | RESPIRATION RATE: 18 BRPM | OXYGEN SATURATION: 98 % | SYSTOLIC BLOOD PRESSURE: 116 MMHG | DIASTOLIC BLOOD PRESSURE: 62 MMHG | SYSTOLIC BLOOD PRESSURE: 112 MMHG | SYSTOLIC BLOOD PRESSURE: 118 MMHG | DIASTOLIC BLOOD PRESSURE: 65 MMHG | HEART RATE: 71 BPM | DIASTOLIC BLOOD PRESSURE: 76 MMHG | RESPIRATION RATE: 18 BRPM | TEMPERATURE: 97.9 F | SYSTOLIC BLOOD PRESSURE: 141 MMHG | DIASTOLIC BLOOD PRESSURE: 62 MMHG | SYSTOLIC BLOOD PRESSURE: 138 MMHG | SYSTOLIC BLOOD PRESSURE: 111 MMHG | OXYGEN SATURATION: 97 % | WEIGHT: 195 LBS | HEART RATE: 72 BPM | DIASTOLIC BLOOD PRESSURE: 54 MMHG | DIASTOLIC BLOOD PRESSURE: 76 MMHG | RESPIRATION RATE: 12 BRPM | RESPIRATION RATE: 13 BRPM | DIASTOLIC BLOOD PRESSURE: 57 MMHG | SYSTOLIC BLOOD PRESSURE: 110 MMHG | DIASTOLIC BLOOD PRESSURE: 69 MMHG | WEIGHT: 195 LBS | HEIGHT: 69 IN | HEART RATE: 69 BPM | HEART RATE: 70 BPM | HEART RATE: 68 BPM | HEART RATE: 73 BPM | SYSTOLIC BLOOD PRESSURE: 118 MMHG | RESPIRATION RATE: 13 BRPM | SYSTOLIC BLOOD PRESSURE: 123 MMHG | DIASTOLIC BLOOD PRESSURE: 55 MMHG | SYSTOLIC BLOOD PRESSURE: 115 MMHG | HEART RATE: 73 BPM | SYSTOLIC BLOOD PRESSURE: 112 MMHG | RESPIRATION RATE: 18 BRPM | SYSTOLIC BLOOD PRESSURE: 111 MMHG | HEART RATE: 56 BPM

## 2025-04-06 PROBLEM — Z86.0101 HX OF ADENOMATOUS COLONIC POLYPS: Status: ACTIVE | Noted: 2025-04-08

## 2025-04-06 PROBLEM — Z80.0: Status: ACTIVE | Noted: 2025-04-08

## 2025-04-08 ENCOUNTER — AMBULATORY SURGICAL CENTER (OUTPATIENT)
Dept: URBAN - METROPOLITAN AREA SURGERY 12 | Facility: SURGERY | Age: 70
End: 2025-04-08
Payer: MEDICARE

## 2025-04-08 ENCOUNTER — OFFICE (OUTPATIENT)
Dept: URBAN - METROPOLITAN AREA PATHOLOGY 2 | Facility: PATHOLOGY | Age: 70
End: 2025-04-08
Payer: MEDICARE

## 2025-04-08 DIAGNOSIS — Z86.0101 PERSONAL HISTORY OF ADENOMATOUS AND SERRATED COLON POLYPS: ICD-10-CM

## 2025-04-08 DIAGNOSIS — Z09 ENCOUNTER FOR FOLLOW-UP EXAMINATION AFTER COMPLETED TREATMEN: ICD-10-CM

## 2025-04-08 DIAGNOSIS — D12.2 BENIGN NEOPLASM OF ASCENDING COLON: ICD-10-CM

## 2025-04-08 DIAGNOSIS — D12.3 BENIGN NEOPLASM OF TRANSVERSE COLON: ICD-10-CM

## 2025-04-08 DIAGNOSIS — D12.5 BENIGN NEOPLASM OF SIGMOID COLON: ICD-10-CM

## 2025-04-08 DIAGNOSIS — Z80.0 FAMILY HISTORY OF MALIGNANT NEOPLASM OF DIGESTIVE ORGANS: ICD-10-CM

## 2025-04-08 DIAGNOSIS — K57.30 DIVERTICULOSIS OF LARGE INTESTINE WITHOUT PERFORATION OR ABS: ICD-10-CM

## 2025-04-08 DIAGNOSIS — K63.5 POLYP OF COLON: ICD-10-CM

## 2025-04-08 DIAGNOSIS — K64.4 RESIDUAL HEMORRHOIDAL SKIN TAGS: ICD-10-CM

## 2025-04-08 DIAGNOSIS — K64.8 OTHER HEMORRHOIDS: ICD-10-CM

## 2025-04-08 DIAGNOSIS — D50.9 IRON DEFICIENCY ANEMIA, UNSPECIFIED: ICD-10-CM

## 2025-04-08 PROCEDURE — 45380 COLONOSCOPY AND BIOPSY: CPT | Performed by: INTERNAL MEDICINE

## 2025-04-08 PROCEDURE — 88305 TISSUE EXAM BY PATHOLOGIST: CPT | Performed by: PATHOLOGY

## 2025-06-04 ENCOUNTER — OFFICE (OUTPATIENT)
Dept: URBAN - METROPOLITAN AREA CLINIC 26 | Facility: CLINIC | Age: 70
End: 2025-06-04
Payer: MEDICARE

## 2025-06-04 VITALS
DIASTOLIC BLOOD PRESSURE: 80 MMHG | DIASTOLIC BLOOD PRESSURE: 73 MMHG | WEIGHT: 209 LBS | SYSTOLIC BLOOD PRESSURE: 140 MMHG | TEMPERATURE: 98.3 F | HEART RATE: 68 BPM | SYSTOLIC BLOOD PRESSURE: 145 MMHG | HEIGHT: 69 IN

## 2025-06-04 DIAGNOSIS — K21.9 GASTRO-ESOPHAGEAL REFLUX DISEASE WITHOUT ESOPHAGITIS: ICD-10-CM

## 2025-06-04 DIAGNOSIS — D50.9 IRON DEFICIENCY ANEMIA, UNSPECIFIED: ICD-10-CM

## 2025-06-04 DIAGNOSIS — Z86.0101 PERSONAL HISTORY OF ADENOMATOUS AND SERRATED COLON POLYPS: ICD-10-CM

## 2025-06-04 DIAGNOSIS — E83.110 HEREDITARY HEMOCHROMATOSIS: ICD-10-CM

## 2025-06-04 PROCEDURE — 99213 OFFICE O/P EST LOW 20 MIN: CPT | Performed by: NURSE PRACTITIONER

## 2025-07-02 NOTE — ASSESSMENT & PLAN NOTE
Taking supplement      Orders:    Vitamin D 25-Hydroxy,Total (for eval of Vitamin D levels); Future

## 2025-07-02 NOTE — PROGRESS NOTES
"  PRIMARY CARE- OFFICE VISIT       Subjective              Enrique Stock is a 70 y.o. male here for follow up        Chief Complaint   Patient presents with    Medicare Annual Wellness Visit Subsequent     Pt presents for MWV.    Follow-up     Pt presents for 6 mth fuv. Pt states no concerns to discuss.       HPI     Pt is here for follow up office visit - Medicare Wellness/health maintenance were also discussed      Pt is also here for Follow up for depression, vit d vitb12, migraines, prediabetes , chol , elevated BMI, vit D       Pt is doing well - retired now and doing well with weight - down 18 pounds     Wife- ovarian cancer- having chemotherapy      Pt noted \"popping \" on and off in both knees - no pain      Occ mild intermittent right hip pain- none for 2 weeks - will monitor and call if recurs       Other medical specialists are involved in patient's care.    Any recent notes that were available were reviewed.    All conditions are monitored, evaluated and assessed regularly.    Patient is compliant with current treatment regimen/medications.    Specialists involved include:    Dr Isaac - ENT     Dr Juarez- PN 5/6/25     Dr Guardado- manages- hemachromatosis- ferritin , low platelets- PN 2/20/25  RN- Reprogle - Oncology- PN 2/20/25     Dr Olea/Lety- PN 6/4/25     Dr Mason Mann- 10/24/23- mass forearm- ultrasound was normal             Patient Health Questionnaire-2 Score: 0 (07/07/25 0925)       A physical exam and/or Medicare Wellness was done at  today's office visit and if patient has requested other problems be addressed at today's office visit in addition to the physical portion of the visit:  the patient has been informed that they may be charged for both portions: discussing the medical problems they want addressed and the complete physical exam. Patient has signed consent and was given brochure.          Medicare Wellness Exam    The patent is being seen for subsequent  annual " wellness visit  Past Medical, Surgical and family History: Reviewed and updated in chart  Medications and Supplements: Review of all medications by a prescribing practitioner or clinical pharmacist (such as prescriptions, OTC, Herbal therapies and supplements) documented in the medical record.      Immunization History   Administered Date(s) Administered    Flu vaccine, quadrivalent, high-dose, preservative free, age 65y+ (FLUZONE) 10/15/2020, 10/09/2023    Flu vaccine, trivalent, preservative free, HIGH-DOSE, age 65y+ (Fluzone) 10/15/2020, 10/10/2021, 10/18/2022, 09/29/2024, 09/29/2024    Hepatitis A vaccine, age 19 years and greater (HAVRIX) 04/24/2000    Hepatitis B vaccine, adult *Check Product/Dose* 04/24/2000, 05/30/2000    Influenza, Unspecified 10/13/2014, 09/01/2019    Influenza, seasonal, injectable 10/26/2009, 10/12/2013, 10/15/2015, 10/04/2016, 10/15/2020    Moderna COVID-19 vaccine, 12 years and older (50mcg/0.5mL)(Spikevax) 09/29/2024    Pfizer COVID-19 vaccine, 12 years and older, (30mcg/0.3mL) (Comirnaty) 11/02/2023    Pfizer Purple Cap SARS-CoV-2 03/08/2021, 03/29/2021, 10/10/2021, 10/18/2022    Pneumococcal conjugate vaccine, 13-valent (PREVNAR 13) 04/04/2019    Pneumococcal polysaccharide vaccine, 23-valent, age 2 years and older (PNEUMOVAX 23) 06/03/2020    Poliovirus vaccine, subcutaneous (IPOL) 05/17/2005    Tdap vaccine, age 7 year and older (BOOSTRIX, ADACEL) 01/01/2005, 06/24/2008, 02/19/2016    Typhoid, Unspecified 08/03/2011    Zoster, live 01/01/2013       Labs reported in this progress note have been reviewed at or prior to this office visit.        Lab on 02/11/2025   Component Date Value Ref Range Status    WBC 02/11/2025 6.6  4.4 - 11.3 x10*3/uL Final    nRBC 02/11/2025    Final    Not Measured    RBC 02/11/2025 4.35 (L)  4.50 - 5.90 x10*6/uL Final    Hemoglobin 02/11/2025 12.4 (L)  13.5 - 17.5 g/dL Final    Hematocrit 02/11/2025 38.1 (L)  41.0 - 52.0 % Final    MCV 02/11/2025 88  80  - 100 fL Final    MCH 02/11/2025 28.5  26.0 - 34.0 pg Final    MCHC 02/11/2025 32.5  32.0 - 36.0 g/dL Final    RDW 02/11/2025 14.1  11.5 - 14.5 % Final    Platelets 02/11/2025 258  150 - 450 x10*3/uL Final    Neutrophils % 02/11/2025 58.2  40.0 - 80.0 % Final    Immature Granulocytes %, Automated 02/11/2025 0.2  0.0 - 0.9 % Final    Immature Granulocyte Count (IG) includes promyelocytes, myelocytes and metamyelocytes but does not include bands. Percent differential counts (%) should be interpreted in the context of the absolute cell counts (cells/UL).    Lymphocytes % 02/11/2025 29.4  13.0 - 44.0 % Final    Monocytes % 02/11/2025 9.6  2.0 - 10.0 % Final    Eosinophils % 02/11/2025 2.3  0.0 - 6.0 % Final    Basophils % 02/11/2025 0.3  0.0 - 2.0 % Final    Neutrophils Absolute 02/11/2025 3.87  1.60 - 5.50 x10*3/uL Final    Percent differential counts (%) should be interpreted in the context of the absolute cell counts (cells/uL).    Immature Granulocytes Absolute, Au* 02/11/2025 0.01  0.00 - 0.50 x10*3/uL Final    Lymphocytes Absolute 02/11/2025 1.95  0.80 - 3.00 x10*3/uL Final    Monocytes Absolute 02/11/2025 0.64  0.05 - 0.80 x10*3/uL Final    Eosinophils Absolute 02/11/2025 0.15  0.00 - 0.40 x10*3/uL Final    Basophils Absolute 02/11/2025 0.02  0.00 - 0.10 x10*3/uL Final    Glucose 02/11/2025 106 (H)  74 - 99 mg/dL Final    Sodium 02/11/2025 140  136 - 145 mmol/L Final    Potassium 02/11/2025 4.2  3.5 - 5.3 mmol/L Final    Chloride 02/11/2025 106  98 - 107 mmol/L Final    Bicarbonate 02/11/2025 25  21 - 32 mmol/L Final    Anion Gap 02/11/2025 13  10 - 20 mmol/L Final    Urea Nitrogen 02/11/2025 17  6 - 23 mg/dL Final    Creatinine 02/11/2025 0.88  0.50 - 1.30 mg/dL Final    eGFR 02/11/2025 >90  >60 mL/min/1.73m*2 Final    Calculations of estimated GFR are performed using the 2021 CKD-EPI Study Refit equation without the race variable for the IDMS-Traceable creatinine  methods.  https://jasn.asnjournals.org/content/early/2021/09/22/ASN.3698590702    Calcium 02/11/2025 9.4  8.6 - 10.6 mg/dL Final    Albumin 02/11/2025 4.0  3.4 - 5.0 g/dL Final    Alkaline Phosphatase 02/11/2025 78  33 - 136 U/L Final    Total Protein 02/11/2025 6.7  6.4 - 8.2 g/dL Final    AST 02/11/2025 13  9 - 39 U/L Final    Bilirubin, Total 02/11/2025 0.4  0.0 - 1.2 mg/dL Final    ALT 02/11/2025 10  10 - 52 U/L Final    Patients treated with Sulfasalazine may generate falsely decreased results for ALT.    Ferritin 02/11/2025 24  20 - 300 ng/mL Final    Iron 02/11/2025 27 (L)  35 - 150 ug/dL Final    UIBC 02/11/2025 237  110 - 370 ug/dL Final    TIBC 02/11/2025 264  240 - 445 ug/dL Final    % Saturation 02/11/2025 10 (L)  25 - 45 % Final    Alpha-Fetoprotein 02/11/2025 <4  0 - 9 ng/mL Final   Lab on 01/07/2025   Component Date Value Ref Range Status    Albumin 01/07/2025 4.4  3.4 - 5.0 g/dL Final    Bilirubin, Total 01/07/2025 0.6  0.0 - 1.2 mg/dL Final    Bilirubin, Direct 01/07/2025 0.1  0.0 - 0.3 mg/dL Final    MILD HEMOLYSIS DETECTED. The result may be falsely decreased due to hemolysis or other interferents. Clinical correlation is recommended. Repeat testing may be considered.    Alkaline Phosphatase 01/07/2025 80  33 - 136 U/L Final    ALT 01/07/2025 24  10 - 52 U/L Final    Patients treated with Sulfasalazine may generate falsely decreased results for ALT.    AST 01/07/2025 21  9 - 39 U/L Final    MILD HEMOLYSIS DETECTED. The result may be falsely elevated due to hemolysis or other interferents. Clinical correlation is recommended. Repeat testing may be considered.    Total Protein 01/07/2025 7.1  6.4 - 8.2 g/dL Final    Cholesterol 01/07/2025 220 (H)  0 - 199 mg/dL Final          Age      Desirable   Borderline High   High     0-19 Y     0 - 169       170 - 199     >/= 200    20-24 Y     0 - 189       190 - 224     >/= 225         >24 Y     0 - 199       200 - 239     >/= 240   **All ranges are based on  fasting samples. Specific   therapeutic targets will vary based on patient-specific   cardiac risk.    Pediatric guidelines reference:Pediatrics 2011, 128(S5).Adult guidelines reference: NCEP ATPIII Guidelines,J LUIS 2001, 258:2486-97    Venipuncture immediately after or during the administration of Metamizole may lead to falsely low results. Testing should be performed immediately prior to Metamizole dosing.    HDL-Cholesterol 01/07/2025 46.6  mg/dL Final      Age       Very Low   Low     Normal    High    0-19 Y    < 35      < 40     40-45     ----  20-24 Y    ----     < 40      >45      ----        >24 Y      ----     < 40     40-60      >60      Cholesterol/HDL Ratio 01/07/2025 4.7   Final      Ref Values  Desirable  < 3.4  High Risk  > 5.0    LDL Calculated 01/07/2025 141 (H)  <=99 mg/dL Final                                Near   Borderline      AGE      Desirable  Optimal    High     High     Very High     0-19 Y     0 - 109     ---    110-129   >/= 130     ----    20-24 Y     0 - 119     ---    120-159   >/= 160     ----      >24 Y     0 -  99   100-129  130-159   160-189     >/=190      VLDL 01/07/2025 32  0 - 40 mg/dL Final    Triglycerides 01/07/2025 160 (H)  0 - 149 mg/dL Final    Age              Desirable        Borderline         High        Very High  SEX:B           mg/dL             mg/dL               mg/dL      mg/dL  <=14D                       ----               ----        ----  15D-365D                    ----               ----        ----  1Y-9Y           0-74               75-99             >=100       ----  10Y-19Y        0-89                            >=130       ----  20Y-24Y        0-114             115-149             >=150      ----  >= 25Y         0-149             150-199             200-499    >=500      Venipuncture immediately after or during the administration of Metamizole may lead to falsely low results. Testing should be performed immediately prior to  Metamizole dosing.    Non HDL Cholesterol 01/07/2025 173 (H)  0 - 149 mg/dL Final          Age       Desirable   Borderline High   High     Very High     0-19 Y     0 - 119       120 - 144     >/= 145    >/= 160    20-24 Y     0 - 149       150 - 189     >/= 190      ----         >24 Y    30 mg/dL above LDL Cholesterol goal      Hemoglobin A1C 01/07/2025 5.6  See comment % Final    Estimated Average Glucose 01/07/2025 114  Not Established mg/dL Final    Vitamin D, 25-Hydroxy, Total 01/07/2025 78  30 - 100 ng/mL Final    Thyroid Stimulating Hormone 01/07/2025 1.82  0.44 - 3.98 mIU/L Final   Lab on 08/16/2024   Component Date Value Ref Range Status    WBC 08/16/2024 6.5  4.4 - 11.3 x10*3/uL Final    nRBC 08/16/2024    Final    Not Measured    RBC 08/16/2024 4.65  4.50 - 5.90 x10*6/uL Final    Hemoglobin 08/16/2024 14.3  13.5 - 17.5 g/dL Final    Hematocrit 08/16/2024 41.9  41.0 - 52.0 % Final    MCV 08/16/2024 90  80 - 100 fL Final    MCH 08/16/2024 30.8  26.0 - 34.0 pg Final    MCHC 08/16/2024 34.1  32.0 - 36.0 g/dL Final    RDW 08/16/2024 12.7  11.5 - 14.5 % Final    Platelets 08/16/2024 222  150 - 450 x10*3/uL Final    Neutrophils % 08/16/2024 50.6  40.0 - 80.0 % Final    Immature Granulocytes %, Automated 08/16/2024 0.5  0.0 - 0.9 % Final    Immature Granulocyte Count (IG) includes promyelocytes, myelocytes and metamyelocytes but does not include bands. Percent differential counts (%) should be interpreted in the context of the absolute cell counts (cells/UL).    Lymphocytes % 08/16/2024 37.0  13.0 - 44.0 % Final    Monocytes % 08/16/2024 8.4  2.0 - 10.0 % Final    Eosinophils % 08/16/2024 2.9  0.0 - 6.0 % Final    Basophils % 08/16/2024 0.6  0.0 - 2.0 % Final    Neutrophils Absolute 08/16/2024 3.30  1.60 - 5.50 x10*3/uL Final    Percent differential counts (%) should be interpreted in the context of the absolute cell counts (cells/uL).    Immature Granulocytes Absolute, Au* 08/16/2024 0.03  0.00 - 0.50 x10*3/uL  Final    Lymphocytes Absolute 08/16/2024 2.41  0.80 - 3.00 x10*3/uL Final    Monocytes Absolute 08/16/2024 0.55  0.05 - 0.80 x10*3/uL Final    Eosinophils Absolute 08/16/2024 0.19  0.00 - 0.40 x10*3/uL Final    Basophils Absolute 08/16/2024 0.04  0.00 - 0.10 x10*3/uL Final    Glucose 08/16/2024 82  74 - 99 mg/dL Final    Sodium 08/16/2024 139  136 - 145 mmol/L Final    Potassium 08/16/2024 4.0  3.5 - 5.3 mmol/L Final    Chloride 08/16/2024 104  98 - 107 mmol/L Final    Bicarbonate 08/16/2024 24  21 - 32 mmol/L Final    Anion Gap 08/16/2024 15  10 - 20 mmol/L Final    Urea Nitrogen 08/16/2024 11  6 - 23 mg/dL Final    Creatinine 08/16/2024 0.85  0.50 - 1.30 mg/dL Final    eGFR 08/16/2024 >90  >60 mL/min/1.73m*2 Final    Calculations of estimated GFR are performed using the 2021 CKD-EPI Study Refit equation without the race variable for the IDMS-Traceable creatinine methods.  https://jasn.asnjournals.org/content/early/2021/09/22/ASN.3193656687    Calcium 08/16/2024 9.3  8.6 - 10.6 mg/dL Final    Albumin 08/16/2024 4.5  3.4 - 5.0 g/dL Final    Alkaline Phosphatase 08/16/2024 74  33 - 136 U/L Final    Total Protein 08/16/2024 7.1  6.4 - 8.2 g/dL Final    AST 08/16/2024 18  9 - 39 U/L Final    Bilirubin, Total 08/16/2024 0.6  0.0 - 1.2 mg/dL Final    ALT 08/16/2024 21  10 - 52 U/L Final    Patients treated with Sulfasalazine may generate falsely decreased results for ALT.    Ferritin 08/16/2024 27  20 - 300 ng/mL Final    Iron 08/16/2024 80  35 - 150 ug/dL Final    UIBC 08/16/2024 255  110 - 370 ug/dL Final    TIBC 08/16/2024 335  240 - 445 ug/dL Final    % Saturation 08/16/2024 24 (L)  25 - 45 % Final    Alpha-Fetoprotein 08/16/2024 <4  0 - 9 ng/mL Final    Folate, Serum 08/16/2024 14.6  >5.0 ng/mL Final    Vitamin B12 08/16/2024 659  211 - 911 pg/mL Final                     Health Maintenance Topics        Topic Date     Zoster Vaccines At pharmacy for Shingrix; had Zostavax     Medicare Annual Wellness Visit (AWV)  "today     Health Maintenance Topics with due status: Not Due       Topic Last Completion Date    DTaP/Tdap/Td Vaccines 02/19/2016     Health Maintenance Topics with due status: Completed       Topic Last Completion Date    Hepatitis C Screening 04/02/2019    Pneumococcal Vaccine 06/03/2020    HPV Vaccines Completed               Tobacco Use History[1]      Social History     Substance and Sexual Activity   Alcohol Use Never          Current Medications[2]      The following portions of the patient's chart were reviewed in this encounter and updated as appropriate:  Tobacco  Allergies  Meds  Problems  Med Hx  Surg Hx     Family History[3]       Cancer-related family history includes Colon cancer in an other family member; Prostate cancer in his father; Skin cancer in his father.            Lifestyle and medical management to decrease cardiovascular risks.      The following portions of the patient's chart were reviewed in this encounter and updated as appropriate:  Tobacco  Allergies  Meds  Problems  Med Hx  Surg Hx  Fam Hx         Review of Systems       Objective      Objective        Vitals:    07/07/25 0927   BP: 125/72   BP Location: Left arm   Patient Position: Sitting   BP Cuff Size: Large adult   Pulse: 53   Resp: 14   Temp: 36.2 °C (97.1 °F)   TempSrc: Temporal   SpO2: 95%   Weight: 92.7 kg (204 lb 4.8 oz)   Height: 1.765 m (5' 9.5\")         Patient is alert and oriented x 3 , NAD  HEAD- normocephalic and atraumatic   EYES-conjunctiva-normal, lids - normal  EARS/NOSE- normal external exam   NECK-supple,FROM  CV- RRR without murmur  PULM- CTA bilaterally, normal respiratory effort  RESPIRATORY EFFORT- normal , no retractions or nasal flaring   ABD- normoactive BS's   EXT- no edema,NT  SKIN- no abnormal skin lesions noted  NEURO- no focal deficits  PSYCH- pleasant, normal judgement and insight      BP Readings from Last 3 Encounters:   07/07/25 125/72   02/20/25 126/76   01/07/25 128/74           Wt " Readings from Last 3 Encounters:   07/07/25 92.7 kg (204 lb 4.8 oz)   02/20/25 94.3 kg (207 lb 14.3 oz)   01/07/25 101 kg (222 lb 3.2 oz)         BMI Readings from Last 3 Encounters:   07/07/25 29.74 kg/m²   02/20/25 30.34 kg/m²   01/07/25 31.88 kg/m²         The number and complexity of problems addressed is considered moderate.  The amount and/or complexity of data reviewed and analyzed is considered moderate. The risk of complications and/or morbidity/mortality of patient is considered moderate. Overall, this patient encounter is considered a moderate risk visit.    Patient has no concerns with pain today.      Patient is not on any high risk medications.      Patient is compliant with their medications.      Common Side Effects- Beta-blockers:    Dizziness  Feeling tired  Feeling lightheaded  Vision problems  Swelling of the hands, feet, ankles, or legs  Decreased sexual ability    Call your doctor if you have any of these signs:  Chest pain  Irregular heartbeat  Painful erection in men        Assessment/Plan        Assessment/Plan            Assessment & Plan  Medicare annual wellness visit, subsequent         Screening for multiple conditions         Vitamin D deficiency  Taking supplement      Orders:    Vitamin D 25-Hydroxy,Total (for eval of Vitamin D levels); Future     Prediabetes  Diet and exercise     Orders:    Hemoglobin A1C; Future     Hypercholesterolemia  No Medication     Orders:    Hepatic Function Panel; Future    Lipid Panel; Future     Vitamin B12 deficiency      Orders:    Vitamin B12; Future     Allergic rhinitis, unspecified seasonality, unspecified trigger  flonase          ELISEO (obstructive sleep apnea)            Other migraine without status migrainosus, not intractable  propranolol          Recurrent major depressive disorder, in full remission         Hemochromatosis, unspecified hemochromatosis type  Dr Olea                        Continue medications      Ordered lab      Follow up  in 6 months       Recommendations for men annual wellness exam:   Screening for Prostate Cancer- Men aged 55-69 years   colonoscopy at age 45, earlier if positive family history for breast or colon cancer   Screening for lung cancer with low-dose CT in all adults age 50-80 who have a 20 pack-year smoking history and currently smoke or have quit within the past 15 years  Screen AAA- Ultrasound one time only- if history smoking 100 cigarettes or more - men ages 65-75 years   Follow a healthy diet (Examples, Dash diet, Mediterranean diet)  Exercise 150 minutes per week   Maintain healthy weight (BMI < 25)  Do not smoke   Alcohol in moderation (up to 1 drink/day)  Get enough sleep (7-8 hours/night)  Make sure immunizations are up to date   Visit dentist twice yearly    If you are less than 60 years old, have diabetes mellitus, or chronic kidney disease, your goal blood pressure is < 140/90.  If you are older than 60 years old and do not have diabetes or kidney disease, your goal blood pressure is < 150/90.                    Time spent during the office visit includes-    updating and familiarizing myself with patients past medical history, social history, and allergies :  discussing ROS ;  obtaining direct  chief complaint and history of present illness from patient ,  reviewing and reconciling current medication list - both prescription and over the counter medications    clinically examine patient    determine what testing if any is needed to  diagnose the condition/conditions  with which patient is presenting    using medical knowledge to put all of the information together and decide on best course of action to proceed with diagnosis  and  treatment for the presenting problem or problems    Pre-visit planning, chart review, and face-to-face encounter   Discussion of medications  Coordination with office staff for med adjustments   Final documentation of visit               [1]   Social History  Tobacco Use    Smoking Status Never    Passive exposure: Never   Smokeless Tobacco Never   [2]   Current Outpatient Medications   Medication Sig Dispense Refill    acetaminophen (Tylenol) 500 mg tablet Take 1 tablet (500 mg) by mouth once daily. 2 tabs in am and then prn      CALCIUM CITRATE-VITAMIN D3 ORAL Take by mouth.      cholecalciferol (Vitamin D-3) 5,000 Units tablet Take by mouth.      cyanocobalamin (Vitamin B-12) 1,000 mcg tablet Take by mouth.      fluticasone (Flonase) 50 mcg/actuation nasal spray Administer 1 spray into each nostril every other day. Shake gently. Before first use, prime pump. After use, clean tip and replace cap.      magnesium 250 mg tablet Take 1 tablet (250 mg) by mouth twice a day.      omega-3 fatty acids-fish oil 360-1,200 mg capsule Take by mouth.      omeprazole (PriLOSEC) 20 mg DR capsule Take by mouth.      oxybutynin XL (Ditropan-XL) 5 mg 24 hr tablet Take 1 tablet (5 mg) by mouth once daily.      propranolol (Inderal) 60 mg tablet Take 1 tablet (60 mg) by mouth every 12 hours. 28 tablet 11    propranolol (Inderal) 60 mg tablet TAKE 1 TABLET ONCE DAILY 90 tablet 3    psyllium (MetamuciL) 0.4 gram capsule Take by mouth.      rizatriptan (Maxalt) 10 mg tablet       tamsulosin (Flomax) 0.4 mg 24 hr capsule Take 2 capsules (0.8 mg) by mouth once daily.       No current facility-administered medications for this visit.   [3]   Family History  Problem Relation Name Age of Onset    Multiple sclerosis Mother      COPD Father Bernard Stock     Hiatal hernia Father Bernard Stock     Hypertension Father Bernard Stock     Prostate cancer Father Bernard Stock     Skin cancer Father Bernard Stock     Hearing loss Father Bernard Stock     Migraines Father Bernard Stock     Colon cancer Other Cousin

## 2025-07-07 ENCOUNTER — APPOINTMENT (OUTPATIENT)
Dept: PRIMARY CARE | Facility: CLINIC | Age: 70
End: 2025-07-07
Payer: MEDICARE

## 2025-07-07 VITALS
WEIGHT: 204.3 LBS | HEART RATE: 53 BPM | SYSTOLIC BLOOD PRESSURE: 125 MMHG | DIASTOLIC BLOOD PRESSURE: 72 MMHG | HEIGHT: 70 IN | TEMPERATURE: 97.1 F | BODY MASS INDEX: 29.25 KG/M2 | RESPIRATION RATE: 14 BRPM | OXYGEN SATURATION: 95 %

## 2025-07-07 DIAGNOSIS — J30.9 ALLERGIC RHINITIS, UNSPECIFIED SEASONALITY, UNSPECIFIED TRIGGER: Chronic | ICD-10-CM

## 2025-07-07 DIAGNOSIS — E78.00 HYPERCHOLESTEROLEMIA: Chronic | ICD-10-CM

## 2025-07-07 DIAGNOSIS — G43.809 OTHER MIGRAINE WITHOUT STATUS MIGRAINOSUS, NOT INTRACTABLE: Chronic | ICD-10-CM

## 2025-07-07 DIAGNOSIS — Z00.00 MEDICARE ANNUAL WELLNESS VISIT, SUBSEQUENT: Primary | Chronic | ICD-10-CM

## 2025-07-07 DIAGNOSIS — F33.42 RECURRENT MAJOR DEPRESSIVE DISORDER, IN FULL REMISSION: Chronic | ICD-10-CM

## 2025-07-07 DIAGNOSIS — E53.8 VITAMIN B12 DEFICIENCY: Chronic | ICD-10-CM

## 2025-07-07 DIAGNOSIS — R73.03 PREDIABETES: Chronic | ICD-10-CM

## 2025-07-07 DIAGNOSIS — Z13.89 SCREENING FOR MULTIPLE CONDITIONS: Chronic | ICD-10-CM

## 2025-07-07 DIAGNOSIS — G47.33 OSA (OBSTRUCTIVE SLEEP APNEA): Chronic | ICD-10-CM

## 2025-07-07 DIAGNOSIS — E55.9 VITAMIN D DEFICIENCY: Chronic | ICD-10-CM

## 2025-07-07 DIAGNOSIS — E83.119 HEMOCHROMATOSIS, UNSPECIFIED HEMOCHROMATOSIS TYPE: Chronic | ICD-10-CM

## 2025-07-07 PROCEDURE — 99214 OFFICE O/P EST MOD 30 MIN: CPT | Performed by: FAMILY MEDICINE

## 2025-07-07 PROCEDURE — G0439 PPPS, SUBSEQ VISIT: HCPCS | Performed by: FAMILY MEDICINE

## 2025-07-07 PROCEDURE — 3008F BODY MASS INDEX DOCD: CPT | Performed by: FAMILY MEDICINE

## 2025-07-07 PROCEDURE — 3074F SYST BP LT 130 MM HG: CPT | Performed by: FAMILY MEDICINE

## 2025-07-07 PROCEDURE — 1036F TOBACCO NON-USER: CPT | Performed by: FAMILY MEDICINE

## 2025-07-07 PROCEDURE — 1159F MED LIST DOCD IN RCRD: CPT | Performed by: FAMILY MEDICINE

## 2025-07-07 PROCEDURE — G2211 COMPLEX E/M VISIT ADD ON: HCPCS | Performed by: FAMILY MEDICINE

## 2025-07-07 PROCEDURE — 1160F RVW MEDS BY RX/DR IN RCRD: CPT | Performed by: FAMILY MEDICINE

## 2025-07-07 PROCEDURE — 1170F FXNL STATUS ASSESSED: CPT | Performed by: FAMILY MEDICINE

## 2025-07-07 PROCEDURE — 3078F DIAST BP <80 MM HG: CPT | Performed by: FAMILY MEDICINE

## 2025-07-07 ASSESSMENT — ACTIVITIES OF DAILY LIVING (ADL)
BATHING: INDEPENDENT
DOING_HOUSEWORK: INDEPENDENT
DRESSING: INDEPENDENT
MANAGING_FINANCES: INDEPENDENT
GROCERY_SHOPPING: INDEPENDENT
TAKING_MEDICATION: INDEPENDENT

## 2025-07-09 LAB
25(OH)D3+25(OH)D2 SERPL-MCNC: 83 NG/ML (ref 30–100)
ALBUMIN SERPL-MCNC: 4.4 G/DL (ref 3.6–5.1)
ALBUMIN/GLOB SERPL: 1.6 (CALC) (ref 1–2.5)
ALP SERPL-CCNC: 75 U/L (ref 35–144)
ALT SERPL-CCNC: 9 U/L (ref 9–46)
AST SERPL-CCNC: 11 U/L (ref 10–35)
BILIRUB DIRECT SERPL-MCNC: 0.1 MG/DL
BILIRUB INDIRECT SERPL-MCNC: 0.5 MG/DL (CALC) (ref 0.2–1.2)
BILIRUB SERPL-MCNC: 0.6 MG/DL (ref 0.2–1.2)
CHOLEST SERPL-MCNC: 188 MG/DL
CHOLEST/HDLC SERPL: 3.8 (CALC)
EST. AVERAGE GLUCOSE BLD GHB EST-MCNC: 117 MG/DL
EST. AVERAGE GLUCOSE BLD GHB EST-SCNC: 6.5 MMOL/L
GLOBULIN SER CALC-MCNC: 2.7 G/DL (CALC) (ref 1.9–3.7)
HBA1C MFR BLD: 5.7 %
HDLC SERPL-MCNC: 49 MG/DL
LDLC SERPL CALC-MCNC: 114 MG/DL (CALC)
NONHDLC SERPL-MCNC: 139 MG/DL (CALC)
PROT SERPL-MCNC: 7.1 G/DL (ref 6.1–8.1)
TRIGL SERPL-MCNC: 130 MG/DL
VIT B12 SERPL-MCNC: 493 PG/ML (ref 200–1100)

## 2025-07-10 ENCOUNTER — APPOINTMENT (OUTPATIENT)
Dept: NEUROLOGY | Facility: CLINIC | Age: 70
End: 2025-07-10
Payer: MEDICARE

## 2025-07-10 VITALS
DIASTOLIC BLOOD PRESSURE: 80 MMHG | WEIGHT: 205 LBS | BODY MASS INDEX: 30.36 KG/M2 | HEIGHT: 69 IN | SYSTOLIC BLOOD PRESSURE: 157 MMHG | HEART RATE: 57 BPM

## 2025-07-10 DIAGNOSIS — R29.898 WEAKNESS OF FOOT, RIGHT: Primary | ICD-10-CM

## 2025-07-10 DIAGNOSIS — G43.809 OTHER MIGRAINE WITHOUT STATUS MIGRAINOSUS, NOT INTRACTABLE: ICD-10-CM

## 2025-07-10 DIAGNOSIS — M54.9 BACK PAIN WITHOUT RADIATION: ICD-10-CM

## 2025-07-10 PROCEDURE — 1036F TOBACCO NON-USER: CPT | Performed by: PSYCHIATRY & NEUROLOGY

## 2025-07-10 PROCEDURE — 3008F BODY MASS INDEX DOCD: CPT | Performed by: PSYCHIATRY & NEUROLOGY

## 2025-07-10 PROCEDURE — 3077F SYST BP >= 140 MM HG: CPT | Performed by: PSYCHIATRY & NEUROLOGY

## 2025-07-10 PROCEDURE — 99214 OFFICE O/P EST MOD 30 MIN: CPT | Performed by: PSYCHIATRY & NEUROLOGY

## 2025-07-10 PROCEDURE — 3079F DIAST BP 80-89 MM HG: CPT | Performed by: PSYCHIATRY & NEUROLOGY

## 2025-07-10 RX ORDER — PROPRANOLOL HYDROCHLORIDE 60 MG/1
30 TABLET ORAL NIGHTLY
Qty: 90 TABLET | Refills: 3 | Status: SHIPPED | OUTPATIENT
Start: 2025-07-10

## 2025-07-10 RX ORDER — PROPRANOLOL HYDROCHLORIDE 60 MG/1
60 TABLET ORAL DAILY
Qty: 28 TABLET | Refills: 11 | Status: SHIPPED | OUTPATIENT
Start: 2025-07-10

## 2025-07-10 ASSESSMENT — ENCOUNTER SYMPTOMS
HEADACHES: 1
WEAKNESS: 1
TREMORS: 1

## 2025-07-10 NOTE — PATIENT INSTRUCTIONS
Please start PT. Need to do 6 weeks. If no improvement or episodes increase in frequency then will get MRI L spine after you complete PT. Please call with update.

## 2025-07-10 NOTE — PROGRESS NOTES
"Subjective   Mitchell Stock \"Enrique\" is a 70 y.o. male who comes in for follow up of migraine.      He reports that the propranolol is working well for him.  He has not needed the rizatriptan in years. He has it incase he needs it but the propranolol does the trick.      Takes 60 mg in the morning and 30 mg in the evening of the propranolol.     He feels that he dragging the right foot.  They go on walks and feels that he is scraping the foot. He catches the right foot on the carpet sometimes.  He denies any numbness in the feet.      He has had intermittent numbness and tingling in the arms. He has intermittent neck pain.      Review of Systems   Musculoskeletal:  Positive for gait problem.   Neurological:  Positive for tremors, weakness and headaches.       Objective   /80 (BP Location: Right arm, Patient Position: Sitting, BP Cuff Size: Adult)   Pulse 57   Ht 1.753 m (5' 9\")   Wt 93 kg (205 lb)   BMI 30.27 kg/m²   Neurological Exam  Physical Exam      Strength is 5/5 in both legs, reflexes are 2/4 at the knee, trace at the ankle on both sides. Vibratory sense intact in both feet.      Assessment/Plan   Mr. Stock is a 70 year old man with a history of migraines and essential tremor presenting to follow up  Well controlled on current medications of propranolol for migraine prevention and tremor as well as rizatriptan for abortive treatment. Will continue current management.     New problem of intermittent right foot weakness with walking. Has mild back pain without radiation. Exam is nonfocal. Likely intermittent L5 radiculopathy.  Will trial PT. Consider MRI L spine if continues.    Sherley Hanna, DO  "

## 2025-07-17 ENCOUNTER — APPOINTMENT (OUTPATIENT)
Dept: NEUROLOGY | Facility: CLINIC | Age: 70
End: 2025-07-17
Payer: MEDICARE

## 2025-07-18 ENCOUNTER — EVALUATION (OUTPATIENT)
Dept: PHYSICAL THERAPY | Facility: CLINIC | Age: 70
End: 2025-07-18
Payer: MEDICARE

## 2025-07-18 DIAGNOSIS — R29.898 WEAKNESS OF RIGHT FOOT: Primary | ICD-10-CM

## 2025-07-18 DIAGNOSIS — M54.9 BACK PAIN WITH RADIATION: ICD-10-CM

## 2025-07-18 PROCEDURE — 97161 PT EVAL LOW COMPLEX 20 MIN: CPT | Mod: GP | Performed by: PHYSICAL THERAPIST

## 2025-07-18 PROCEDURE — 97110 THERAPEUTIC EXERCISES: CPT | Mod: GP | Performed by: PHYSICAL THERAPIST

## 2025-07-18 ASSESSMENT — ENCOUNTER SYMPTOMS
DEPRESSION: 0
OCCASIONAL FEELINGS OF UNSTEADINESS: 0
LOSS OF SENSATION IN FEET: 0

## 2025-07-18 NOTE — PROGRESS NOTES
Physical Therapy    Physical Therapy Evaluation    Patient Name: Mitchell Stock  MRN: [id number]  Today's Date: 7/18/2025  Visit:1  Referred by:Sherley Hanna  Referred for:  Current Problem  Problem List Items Addressed This Visit           ICD-10-CM    Back pain with radiation M54.9    Relevant Orders    Follow Up In Physical Therapy    Weakness of foot - Primary R29.898    Relevant Orders    Follow Up In Physical Therapy     General     Payor: MEDICARE / Plan: MEDICARE PART A AND B / Product Type: *No Product type* /   Time Calculation  Start Time: 1100  Stop Time: 1142  Time Calculation (min): 42 min  PT Evaluation Time Entry  PT Evaluation (Low) Time Entry: 18  PT Therapeutic Procedures Time Entry  Therapeutic Exercise Time Entry: 24          SUBJECTIVE  Precautions        Pain       Chief complaint: Pt reports symptoms began maybe a year ago.  On a tile floor the tennis shoes squeaks.  Often kicks up the throw rugs in the kitchen.      Pain     Pain over lateral aspect of the hip.  Unable to sleep on that side.   Pain ranges:  0-5/10  Increases with: more activity at home  Decreases with: ice packs  Prior level of function: Ind with ADLs  Current functional level:  Pain limits the patient's ability to do ADLs  Home set up: no concerns  Work status: retired   Pt's stated goal: reduce pain and improve function  Pertinent PMH: migraine    Lower Extremity Strength:  Date: EVAL      Myotome RIGHT LEFT   Hip Flexion L1,2 4 4   Knee Extension L3 4 4   Knee Flexion S2 4 4   Ankle DF L4 4 4   Great toe Ext L5 4 4     Lumbar ROM:  Date: EVAL     Percentage    Flexion 75    Extension 50     RIGHT LEFT   Side Bend     Rotation       Joint mobility lumbar WNL and pain free  Posture some lateral curve of the spine noted  Palpation tender R glute med insertion  Neurological symptoms none    Special tests:  Date EVAL   LUMBAR    Slump -   SLR -   Crossed SLR -     Outcome Measures:  Other Measures  Lower Extremity  Funtional Score (LEFS): 61        ASSESSMENT:  The pt presents with signs and symptoms consistent with the medical diagnosis of  R foot weakness and lumbar radiculopathy.  PT has symptoms consistent with glute med tendonopathy.     The pt has impairments including decreased strength, difficulty with gait, balance, and pain.  The pt demonstrates functional limitations which include difficulty with stairs and community ambulation and ADLs.  The pt will benefit from skilled physical therapy to reduce impairments in order to return to prior level of function.     The physical therapy prognosis is excellent for the patient to achieve their goals.   The pt tolerated therapy treatment today well with no adverse effects.  Personal factors that impact therapy include:  chronicity  Clinical presentation: Stable and Predictable   Level of clinical decision making is Low    PLAN  The pt will be seen 1 days a week for 8 weeks.    Medicare certification period: 7/18/2025 - 10/18/25     The pt has been educated about the risks and benefits of physical therapy including manual therapy treatments and gives consent for treatment.     The patient will benefit from physical therapy treatment to include: therapeutic exercises, therapeutic activities, neurological re-education, manual therapy, modalities, and a home exercise program.     The patient's potential to reach their therapy goals is excellent.     The evaluating therapist is prn and therefore the pt's POC may be re-assessed or discharged by another staff therapist at this location based on scheduling and availability.  Pt is aware and agrees.     TREATMENT:    Therapeutic ex:  HEP digital and print provided and reviewed:     Access Code: [email or web address]  URL: [email or web address]  Date: 07/18/2025  Prepared by: Michael Bradley    Exercises  - Seated Heel Toe Raises  - 1 x daily - 3 x weekly - 3 sets - 10 reps  - Seated Ankle Dorsiflexion with Resistance  - 1 x daily  - 3 x weekly - 3 sets - 10 reps  - Eccentric Heel Lowering on Step  - 1 x daily - 3 x weekly - 3 sets - 10 reps  - Supine Hip Adduction Isometric with Ball  - 1 x daily - 3 x weekly - 3 sets - 10 reps  - Hooklying Clamshell with Resistance  - 1 x daily - 3 x weekly - 3 sets - 10 reps  - Beginner Bridge  - 1 x daily - 3 x weekly - 3 sets - 10 reps  Goals:  Active       PT Problem       Pt will have pain no higher than 2/10 for at least 2 weeks        Start:  07/18/25    Expected End:  10/16/25            Pt will have strength of at least 5/5 of R LE to allow the pt to amb without difficulty        Start:  07/18/25    Expected End:  10/16/25            pt will have no TTP over R glute med       Start:  07/18/25    Expected End:  10/16/25            LEFS to at least 71       Start:  07/18/25    Expected End:  10/16/25

## 2025-08-08 ENCOUNTER — TREATMENT (OUTPATIENT)
Dept: PHYSICAL THERAPY | Facility: CLINIC | Age: 70
End: 2025-08-08
Payer: MEDICARE

## 2025-08-08 DIAGNOSIS — R29.898 WEAKNESS OF RIGHT FOOT: Primary | ICD-10-CM

## 2025-08-08 DIAGNOSIS — M54.9 BACK PAIN WITH RADIATION: ICD-10-CM

## 2025-08-08 PROCEDURE — 97110 THERAPEUTIC EXERCISES: CPT | Mod: GP | Performed by: PHYSICAL THERAPIST

## 2025-08-08 NOTE — PROGRESS NOTES
"Physical Therapy Treatment    Patient Name: Mitchell Stock \"Enrique\"  MRN: [id number]  YOB: 1955  Encounter Date: 8/8/2025    Time Entry:  Time Calculation  Start Time: 1545  Stop Time: 1628  Time Calculation (min): 43 min     PT Therapeutic Procedures Time Entry  Therapeutic Exercise Time Entry: 43                   Rehab Insurance Information:   Visit Count: 2     Medicare cert. dates: 07/18/25  Through: 10/18/25          [place] [place] Risk Assessment:         Problem List Items Addressed This Visit           ICD-10-CM    Back pain with radiation M54.9    Weakness of foot - Primary R29.898            Subjective   pt repots he went for a walk wednesday adn when he got out of the car his calf was really hurting.  Pain reported as 1.           Objective               Activities   Therapeutic Exercise  Therapeutic Exercise Performed: Yes  Therapeutic Exercise Activity 1: stepper 5 min  Therapeutic Exercise Activity 2: IB, HS and PSOAS stretches 1 min ea  Therapeutic Exercise Activity 3: Shuttle DL 5 bands, SL 3 bands x 30 ea  Therapeutic Exercise Activity 4: hip abd and ext RTB x 20 ea  Therapeutic Exercise Activity 5: seated ball roll FWD and LAT 2 min ea                                                       Education             Access Code: [email or web address] URL: [email or web address] Date: 07/18/2025 Prepared by: Michael Bradley       Assessment/Plan   Assessment: pt is just beginning therapy.  No goals.  Met.  Added hip strengthening exercises which he reports cuased some fatigue in the hip      Active       Pt will have pain no higher than 2/10 for at least 2 weeks  (Not Progressing)       Start:  07/18/25    Expected End:  10/16/25            Pt will have strength of at least 5/5 of R LE to allow the pt to amb without difficulty  (Not Progressing)       Start:  07/18/25    Expected End:  10/16/25            pt will have no TTP over R glute med (Not Progressing)       Start:  07/18/25    " Expected End:  10/16/25            LEFS to at least 71 (Not Progressing)       Start:  07/18/25    Expected End:  10/16/25           Plan: cont with exercises to tolerance

## 2025-08-15 ENCOUNTER — TREATMENT (OUTPATIENT)
Dept: PHYSICAL THERAPY | Facility: CLINIC | Age: 70
End: 2025-08-15
Payer: MEDICARE

## 2025-08-15 DIAGNOSIS — M54.9 BACK PAIN WITH RADIATION: ICD-10-CM

## 2025-08-15 DIAGNOSIS — R29.898 WEAKNESS OF RIGHT FOOT: Primary | ICD-10-CM

## 2025-08-15 PROCEDURE — 97112 NEUROMUSCULAR REEDUCATION: CPT | Mod: GP | Performed by: PHYSICAL THERAPIST

## 2025-08-15 PROCEDURE — 97110 THERAPEUTIC EXERCISES: CPT | Mod: GP | Performed by: PHYSICAL THERAPIST

## 2025-08-22 ENCOUNTER — LAB (OUTPATIENT)
Dept: LAB | Facility: CLINIC | Age: 70
End: 2025-08-22
Payer: MEDICARE

## 2025-08-22 ENCOUNTER — TREATMENT (OUTPATIENT)
Dept: PHYSICAL THERAPY | Facility: CLINIC | Age: 70
End: 2025-08-22
Payer: MEDICARE

## 2025-08-22 ENCOUNTER — RESULTS FOLLOW-UP (OUTPATIENT)
Dept: HEMATOLOGY/ONCOLOGY | Facility: CLINIC | Age: 70
End: 2025-08-22

## 2025-08-22 DIAGNOSIS — M54.9 BACK PAIN WITH RADIATION: ICD-10-CM

## 2025-08-22 DIAGNOSIS — E83.119 HEMOCHROMATOSIS, UNSPECIFIED HEMOCHROMATOSIS TYPE: ICD-10-CM

## 2025-08-22 DIAGNOSIS — R29.898 WEAKNESS OF RIGHT FOOT: Primary | ICD-10-CM

## 2025-08-22 LAB
AFP SERPL-MCNC: <4 NG/ML (ref 0–9)
ALBUMIN SERPL BCP-MCNC: 4.5 G/DL (ref 3.4–5)
ALP SERPL-CCNC: 66 U/L (ref 33–136)
ALT SERPL W P-5'-P-CCNC: 10 U/L (ref 10–52)
ANION GAP SERPL CALC-SCNC: 13 MMOL/L (ref 10–20)
AST SERPL W P-5'-P-CCNC: 12 U/L (ref 9–39)
BASOPHILS # BLD AUTO: 0.03 X10*3/UL (ref 0–0.1)
BASOPHILS NFR BLD AUTO: 0.4 %
BILIRUB SERPL-MCNC: 0.5 MG/DL (ref 0–1.2)
BUN SERPL-MCNC: 15 MG/DL (ref 6–23)
CALCIUM SERPL-MCNC: 9.5 MG/DL (ref 8.6–10.6)
CHLORIDE SERPL-SCNC: 104 MMOL/L (ref 98–107)
CO2 SERPL-SCNC: 28 MMOL/L (ref 21–32)
CREAT SERPL-MCNC: 0.89 MG/DL (ref 0.5–1.3)
EGFRCR SERPLBLD CKD-EPI 2021: >90 ML/MIN/1.73M*2
EOSINOPHIL # BLD AUTO: 0.25 X10*3/UL (ref 0–0.4)
EOSINOPHIL NFR BLD AUTO: 3.7 %
ERYTHROCYTE [DISTWIDTH] IN BLOOD BY AUTOMATED COUNT: 13.8 % (ref 11.5–14.5)
FERRITIN SERPL-MCNC: 26 NG/ML (ref 20–300)
GLUCOSE SERPL-MCNC: 92 MG/DL (ref 74–99)
HCT VFR BLD AUTO: 41.2 % (ref 41–52)
HGB BLD-MCNC: 13.6 G/DL (ref 13.5–17.5)
IMM GRANULOCYTES # BLD AUTO: 0.01 X10*3/UL (ref 0–0.5)
IMM GRANULOCYTES NFR BLD AUTO: 0.1 % (ref 0–0.9)
IRON SATN MFR SERPL: 12 % (ref 25–45)
IRON SERPL-MCNC: 43 UG/DL (ref 35–150)
LYMPHOCYTES # BLD AUTO: 2.77 X10*3/UL (ref 0.8–3)
LYMPHOCYTES NFR BLD AUTO: 41 %
MCH RBC QN AUTO: 28.7 PG (ref 26–34)
MCHC RBC AUTO-ENTMCNC: 33 G/DL (ref 32–36)
MCV RBC AUTO: 87 FL (ref 80–100)
MONOCYTES # BLD AUTO: 0.58 X10*3/UL (ref 0.05–0.8)
MONOCYTES NFR BLD AUTO: 8.6 %
NEUTROPHILS # BLD AUTO: 3.12 X10*3/UL (ref 1.6–5.5)
NEUTROPHILS NFR BLD AUTO: 46.2 %
NRBC BLD-RTO: NORMAL /100{WBCS}
PLATELET # BLD AUTO: 223 X10*3/UL (ref 150–450)
POTASSIUM SERPL-SCNC: 4.1 MMOL/L (ref 3.5–5.3)
PROT SERPL-MCNC: 7 G/DL (ref 6.4–8.2)
RBC # BLD AUTO: 4.74 X10*6/UL (ref 4.5–5.9)
SODIUM SERPL-SCNC: 141 MMOL/L (ref 136–145)
TIBC SERPL-MCNC: 361 UG/DL (ref 240–445)
UIBC SERPL-MCNC: 318 UG/DL (ref 110–370)
WBC # BLD AUTO: 6.8 X10*3/UL (ref 4.4–11.3)

## 2025-08-22 PROCEDURE — 82105 ALPHA-FETOPROTEIN SERUM: CPT

## 2025-08-22 PROCEDURE — 85025 COMPLETE CBC W/AUTO DIFF WBC: CPT

## 2025-08-22 PROCEDURE — 97110 THERAPEUTIC EXERCISES: CPT | Mod: GP | Performed by: PHYSICAL THERAPIST

## 2025-08-22 PROCEDURE — 82728 ASSAY OF FERRITIN: CPT

## 2025-08-22 PROCEDURE — 36415 COLL VENOUS BLD VENIPUNCTURE: CPT

## 2025-08-22 PROCEDURE — 80053 COMPREHEN METABOLIC PANEL: CPT

## 2025-08-22 PROCEDURE — 83540 ASSAY OF IRON: CPT

## 2025-08-28 ENCOUNTER — OFFICE VISIT (OUTPATIENT)
Dept: HEMATOLOGY/ONCOLOGY | Facility: CLINIC | Age: 70
End: 2025-08-28
Payer: MEDICARE

## 2025-08-28 ENCOUNTER — EDUCATION (OUTPATIENT)
Dept: HEMATOLOGY/ONCOLOGY | Facility: CLINIC | Age: 70
End: 2025-08-28

## 2025-08-28 VITALS
BODY MASS INDEX: 30.79 KG/M2 | RESPIRATION RATE: 16 BRPM | SYSTOLIC BLOOD PRESSURE: 139 MMHG | WEIGHT: 207.89 LBS | TEMPERATURE: 97.7 F | OXYGEN SATURATION: 96 % | DIASTOLIC BLOOD PRESSURE: 77 MMHG | HEIGHT: 69 IN | HEART RATE: 74 BPM

## 2025-08-28 DIAGNOSIS — E83.119 HEMOCHROMATOSIS, UNSPECIFIED HEMOCHROMATOSIS TYPE: ICD-10-CM

## 2025-08-28 PROCEDURE — 99214 OFFICE O/P EST MOD 30 MIN: CPT | Performed by: INTERNAL MEDICINE

## 2025-08-28 PROCEDURE — 1159F MED LIST DOCD IN RCRD: CPT | Performed by: INTERNAL MEDICINE

## 2025-08-28 PROCEDURE — 3008F BODY MASS INDEX DOCD: CPT | Performed by: INTERNAL MEDICINE

## 2025-08-28 PROCEDURE — 3078F DIAST BP <80 MM HG: CPT | Performed by: INTERNAL MEDICINE

## 2025-08-28 PROCEDURE — 1126F AMNT PAIN NOTED NONE PRSNT: CPT | Performed by: INTERNAL MEDICINE

## 2025-08-28 PROCEDURE — 3075F SYST BP GE 130 - 139MM HG: CPT | Performed by: INTERNAL MEDICINE

## 2025-08-28 ASSESSMENT — PAIN SCALES - GENERAL: PAINLEVEL_OUTOF10: 0-NO PAIN

## 2025-08-29 ENCOUNTER — TREATMENT (OUTPATIENT)
Dept: PHYSICAL THERAPY | Facility: CLINIC | Age: 70
End: 2025-08-29
Payer: MEDICARE

## 2025-08-29 DIAGNOSIS — R29.898 WEAKNESS OF RIGHT FOOT: Primary | ICD-10-CM

## 2025-08-29 DIAGNOSIS — M54.9 BACK PAIN WITH RADIATION: ICD-10-CM

## 2025-08-29 PROCEDURE — 97110 THERAPEUTIC EXERCISES: CPT | Mod: GP | Performed by: PHYSICAL THERAPIST

## 2025-09-05 ENCOUNTER — TREATMENT (OUTPATIENT)
Dept: PHYSICAL THERAPY | Facility: CLINIC | Age: 70
End: 2025-09-05
Payer: MEDICARE

## 2025-09-05 DIAGNOSIS — R29.898 WEAKNESS OF RIGHT FOOT: Primary | ICD-10-CM

## 2025-09-05 DIAGNOSIS — M54.9 BACK PAIN WITH RADIATION: ICD-10-CM

## 2025-09-05 PROCEDURE — 97112 NEUROMUSCULAR REEDUCATION: CPT | Mod: GP | Performed by: PHYSICAL THERAPIST

## 2025-09-05 PROCEDURE — 97110 THERAPEUTIC EXERCISES: CPT | Mod: GP | Performed by: PHYSICAL THERAPIST

## 2026-01-08 ENCOUNTER — APPOINTMENT (OUTPATIENT)
Dept: PRIMARY CARE | Facility: CLINIC | Age: 71
End: 2026-01-08
Payer: MEDICARE

## 2026-01-09 ENCOUNTER — APPOINTMENT (OUTPATIENT)
Dept: OTOLARYNGOLOGY | Facility: CLINIC | Age: 71
End: 2026-01-09
Payer: MEDICARE